# Patient Record
Sex: MALE | Race: BLACK OR AFRICAN AMERICAN | URBAN - METROPOLITAN AREA
[De-identification: names, ages, dates, MRNs, and addresses within clinical notes are randomized per-mention and may not be internally consistent; named-entity substitution may affect disease eponyms.]

---

## 2020-08-27 ENCOUNTER — OFFICE VISIT (OUTPATIENT)
Dept: FAMILY MEDICINE | Facility: CLINIC | Age: 30
End: 2020-08-27
Payer: COMMERCIAL

## 2020-08-27 VITALS
BODY MASS INDEX: 32.03 KG/M2 | RESPIRATION RATE: 16 BRPM | HEIGHT: 71 IN | WEIGHT: 228.8 LBS | DIASTOLIC BLOOD PRESSURE: 64 MMHG | SYSTOLIC BLOOD PRESSURE: 109 MMHG | TEMPERATURE: 97.8 F | OXYGEN SATURATION: 100 % | HEART RATE: 83 BPM

## 2020-08-27 DIAGNOSIS — F17.210 TOBACCO SMOKER, LESS THAN 10 CIGARETTES PER DAY: ICD-10-CM

## 2020-08-27 DIAGNOSIS — M54.50 LUMBAR BACK PAIN: ICD-10-CM

## 2020-08-27 DIAGNOSIS — F19.10 POLYSUBSTANCE ABUSE (H): ICD-10-CM

## 2020-08-27 DIAGNOSIS — Z20.5 EXPOSURE TO HEPATITIS C: Primary | ICD-10-CM

## 2020-08-27 LAB
ALBUMIN SERPL-MCNC: 4.7 MG/DL (ref 3.8–5)
ALP SERPL-CCNC: 76.3 U/L (ref 31.7–110.7)
ALT SERPL-CCNC: 32.4 U/L (ref 0–45)
AST SERPL-CCNC: 32.6 U/L (ref 0–55)
BILIRUB SERPL-MCNC: 0.9 MG/DL (ref 0.2–1.3)
BUN SERPL-MCNC: 18.6 MG/DL (ref 7–21)
CALCIUM SERPL-MCNC: 9.7 MG/DL (ref 8.5–10.1)
CHLORIDE SERPLBLD-SCNC: 103.8 MMOL/L (ref 98–110)
CO2 SERPL-SCNC: 23.5 MMOL/L (ref 20–32)
CREAT SERPL-MCNC: 1.1 MG/DL (ref 0.7–1.3)
GFR SERPL CREATININE-BSD FRML MDRD: 83.5 ML/MIN/1.7 M2
GLUCOSE SERPL-MCNC: 107 MG'DL (ref 70–99)
POTASSIUM SERPL-SCNC: 3.6 MMOL/L (ref 3.3–4.5)
PROT SERPL-MCNC: 7.3 G/DL (ref 6.8–8.8)
SODIUM SERPL-SCNC: 137.7 MMOL/L (ref 132.6–141.4)

## 2020-08-27 SDOH — HEALTH STABILITY: MENTAL HEALTH: HOW OFTEN DO YOU HAVE A DRINK CONTAINING ALCOHOL?: NEVER

## 2020-08-27 ASSESSMENT — ENCOUNTER SYMPTOMS
DECREASED CONCENTRATION: 0
HYPERACTIVE: 0
TREMORS: 0
AGITATION: 0
CHILLS: 0
COUGH: 0
SHORTNESS OF BREATH: 0
PALPITATIONS: 0
NERVOUS/ANXIOUS: 0
SLEEP DISTURBANCE: 0
FEVER: 0
ARTHRALGIAS: 0
BACK PAIN: 0

## 2020-08-27 ASSESSMENT — MIFFLIN-ST. JEOR: SCORE: 2019.08

## 2020-08-27 NOTE — PATIENT INSTRUCTIONS
1. Come back in a month for flu shot     2. Athlen X lower back pain videos/ exercise. Try these out to stregnthen lower back and core     3. If your liver enzymes and all blood work look okay just let me know if the liver pain comes back. If so we will get an ultrasound.     Here is the plan from today's visit    **If you had lab testing today and your results are reassuring or normal they will be mailed to you or sent through [facility] within 7 days.   **If the lab tests need quick action we will call you with the results.  The phone number we will call with results is # [phone] (home) . If this is not the best number please call our clinic and change the number.  Medication Refills:  If you need any refills please call your pharmacy and they will contact us.   If you need to  your refill at a new pharmacy, please contact the new pharmacy directly. The new pharmacy will help you get your medications transferred faster.   Scheduling:  If you have any concerns about today's visit or wish to schedule another appointment please call our office during normal business hours [phone] (8-5:00 M-F)  If a referral was made to a [facility] and you don't get a call from central scheduling please call [phone].  If a Mammogram was ordered for you at [facility] call [phone] to schedule or change your appointment.  If you had an XRay/CT/Ultrasound/MRI ordered the number is [phone] to schedule or change your radiology appointment.   Medical Concerns:  If you have urgent medical concerns please call [phone] at any time of the day.    Bryan Engle MD

## 2020-08-27 NOTE — PROGRESS NOTES
Preceptor Attestation:    Patient seen and evaluated in person. I discussed the patient with the resident. I have verified the content of the note, which accurately reflects my assessment of the patient and the plan of care.   Supervising Physician:  Dominick Llamas MD, MD.

## 2020-08-27 NOTE — PROGRESS NOTES
HPI       Buddy Taylor is a 30 year old  who presents for   Chief Complaint   Patient presents with     Physical     LIver check-previous provider recommended Hep C check       Past Polydrug use history  -opiates, heroin, cocaine. Was on suboxone for about 10 years. Last time doing IV drugs about 2 years ago.   - Lst time he had blood work about a year ago his liver enzymes were up. Also a friend used to use with had hep C.   - NO symptoms now.     Mental Health   - Diagnosed bipolar while actively using. Feels very even great mood now with no substance use. No on any psychoactive meds. 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   - Lives in [place] in [place].   - Has been working out more recently. Trying to lose weight as well.   -did eat oatmeal early this AM     Hx of severe sharp right sided abd pain     Cutting on cig down to 2 a day   Working on cutting     Sexual hx- had chlamdyia 2008 maybe? Last time had intercourse about a year ago. Female partners, lifetime count 20+. Declined GC testing      Tooth Pain Getting tooth pulled and taking Nsaids 2-3 times a day.         Family hx COPD, DM2, HTN     Problem, Medication and Allergy Lists were reviewed and updated if needed..    Patient is an established patient of this clinic..         Review of Systems:   Review of Systems   Constitutional: Negative for chills and fever.   Respiratory: Negative for cough and shortness of breath.    Cardiovascular: Negative for chest pain and palpitations.   Musculoskeletal: Negative for arthralgias and back pain.   Neurological: Negative for tremors.   Psychiatric/Behavioral: Negative for agitation, behavioral problems, decreased concentration, self-injury and sleep disturbance. The patient is not nervous/anxious and is not hyperactive.    All other systems reviewed and are negative.           Physical Exam:     Vitals:    08/27/20 0808   BP: 109/64   BP Location: Right arm   Patient  "Position: Sitting   Cuff Size: Adult Large   Pulse: 83   Resp: 16   Temp: 97.8  F (36.6  C)   TempSrc: Oral   SpO2: 100%   Weight: 103.8 kg (228 lb 12.8 oz)   Height: 1.81 m (5' 11.26\")     Body mass index is 31.68 kg/m .  Vitals were reviewed and were normal     Physical Exam  Constitutional:       General: He is not in acute distress.     Appearance: He is well-developed.   HENT:      Head: Normocephalic and atraumatic.   Eyes:      General:         Right eye: No discharge.         Left eye: No discharge.      Conjunctiva/sclera: Conjunctivae normal.   Pulmonary:      Effort: Pulmonary effort is normal. No respiratory distress.   Abdominal:      Comments: Liver edge 3cm below costal margin   Musculoskeletal:         General: No deformity.      Comments: Mild hyper lordosis, tight lumbar paraspinal muscles. ROM of back/trunk otherwise normal. Tight hamstrings.    Skin:     Coloration: Skin is not pale.      Findings: No erythema or rash.   Neurological:      Mental Status: He is alert and oriented to person, place, and time.      Cranial Nerves: Cranial nerve deficit:         Coordination: Coordination normal.           Results:    Results from this visitNo results found for any visits on 08/27/20.    Assessment and Plan     1. Polysubstance abuse (H)  Screening for hep c, b, HIV. High risk behavior also checked RPR today. Even though multiple female partners pt asymptomatic and declined GC/C testing. Did discuss possible to still have GC/C and not show symptoms. CMP also checkedg given recent NSAID use, past hx elevated liver enzymes. Serum glucose for fam hx DM2 and BMI over 30.     Tdap given today. If patient has chronic hep B or C would do P23 otherwise no need.     2. Tobacco  Self tapering down from 1 pack a day at [place] started at age 20. Now to 2 cigarettes a day. Wants to continue to self taper off. Declined further intervention.     3. Discussed coming back in for influenza shot.     4. Pt w occasional " lumbar back pain not present now. Bilateral without radiation. Couple of times a month.  Worse after working long days/lifting. Discussed working on core strengthening, improving flexibility. Return if more symptomatic or symptoms change.     5. Intermittent RUQ pain.   Will rule out hepatitis causes today. Has improved since stopping drinking. Could be biliary disease as well but given it has not happened in quite some time will not work up further other than above blood work. If sx progress would US.        There are no discontinued medications.    Options for treatment and follow-up care were reviewed with the patient. Buddy Taylor  engaged in the decision making process and verbalized understanding of the options discussed and agreed with the final plan.    Bryan Engle MD    Addend: Patient screening ab hep C positive. Mychart sent to come back for follow up RNA testing to see if cleared, active infection or false +.

## 2020-08-28 LAB
HBV CORE AB SERPL QL IA: NONREACTIVE
HBV SURFACE AB SERPL IA-ACNC: 0 M[IU]/ML
HBV SURFACE AG SERPL QL IA: NONREACTIVE
HCV AB SERPL QL IA: REACTIVE
HIV 1+2 AB+HIV1 P24 AG SERPL QL IA: NONREACTIVE
T PALLIDUM AB SER QL: NONREACTIVE

## 2020-08-31 DIAGNOSIS — Z20.5 EXPOSURE TO HEPATITIS C: ICD-10-CM

## 2020-09-03 DIAGNOSIS — B17.10 ACUTE HEPATITIS C VIRUS INFECTION WITHOUT HEPATIC COMA: ICD-10-CM

## 2020-09-03 LAB
HCV RNA SERPL NAA+PROBE-ACNC: ABNORMAL [IU]/ML
HCV RNA SERPL NAA+PROBE-LOG IU: 6.8 LOG IU/ML

## 2020-09-09 ENCOUNTER — TELEPHONE (OUTPATIENT)
Dept: GASTROENTEROLOGY | Facility: CLINIC | Age: 30
End: 2020-09-09

## 2020-09-09 NOTE — TELEPHONE ENCOUNTER
Labs needed for upcoming appt entered and pt updated.    Carole Joe LPN  Hepatology Clinic        -------  [facility] Call Center    Phone Message    May a detailed message be left on voicemail: yes     Reason for Call: Order(s): Other:   Reason for requested: Pt wants lab orders to be sent to the [facility] in [facility]  Date needed: asap   Provider name: Dr. Guadarrama      Action Taken: Message routed to:  [facility] hep    Travel Screening: Not Applicable

## 2020-09-10 DIAGNOSIS — B17.10 ACUTE HEPATITIS C VIRUS INFECTION WITHOUT HEPATIC COMA: Primary | ICD-10-CM

## 2020-09-10 NOTE — TELEPHONE ENCOUNTER
RECORDS RECEIVED FROM: Internal - Acute hepatitis C virus infection without hepatic coma [B17.10]   Appt Date: 09.15.2020   NOTES STATUS DETAILS   OFFICE NOTE from referring provider Internal 09.03.2020 Consult Jackie Tomas MD    OFFICE NOTES from other specialists N/A    DISCHARGE SUMMARY from hospital N/A    MEDICATION LIST N/A    LIVER BIOSPY (IF APPLICABLE)      PATHOLOGY REPORTS  N/A    IMAGING     ENDOSCOPY (IF AVAILABLE) N/A    COLONOSCOPY (IF AVAILABLE) N/A    ULTRASOUND LIVER N/A    CT OF ABDOMEN N/A    MRI OF LIVER N/A    FIBROSCAN, US ELASTOGRAPHY, FIBROSIS SCAN, MR ELASTOGRAPHY N/A    LABS     HEPATIC PANEL (LIVER PANEL) N/A    BASIC METABOLIC PANEL N/A    COMPLETE METABOLIC PANEL N/A    COMPLETE BLOOD COUNT (CBC) N/A    INTERNATIONAL NORMALIZED RATIO (INR) N/A    HEPATITIS C ANTIBODY Internal [id number]   HEPATITIS C VIRAL LOAD/PCR N/A    HEPATITIS C GENOTYPE N/A    HEPATITIS B SURFACE ANTIGEN Internal 08.27.2020   HEPATITIS B SURFACE ANTIBODY Internal 08.27.2020   HEPATITIS B DNA QUANT LEVEL N/A    HEPATITIS B CORE ANTIBODY Internal 08.27.2020

## 2020-09-11 DIAGNOSIS — B17.10 ACUTE HEPATITIS C VIRUS INFECTION WITHOUT HEPATIC COMA: ICD-10-CM

## 2020-09-11 LAB
BILIRUB DIRECT SERPL-MCNC: 0.2 MG/DL (ref 0–0.2)
ERYTHROCYTE [DISTWIDTH] IN BLOOD BY AUTOMATED COUNT: 12.5 % (ref 10–15)
HCT VFR BLD AUTO: 46.2 % (ref 40–53)
HGB BLD-MCNC: 15.5 G/DL (ref 13.3–17.7)
INR PPP: 1.08 (ref 0.86–1.14)
MCH RBC QN AUTO: 29 PG (ref 26.5–33)
MCHC RBC AUTO-ENTMCNC: 33.5 G/DL (ref 31.5–36.5)
MCV RBC AUTO: 86 FL (ref 78–100)
PLATELET # BLD AUTO: 250 10E9/L (ref 150–450)
RBC # BLD AUTO: 5.35 10E12/L (ref 4.4–5.9)
WBC # BLD AUTO: 5.6 10E9/L (ref 4–11)

## 2020-09-15 ENCOUNTER — PRE VISIT (OUTPATIENT)
Dept: GASTROENTEROLOGY | Facility: CLINIC | Age: 30
End: 2020-09-15

## 2020-09-15 ENCOUNTER — VIRTUAL VISIT (OUTPATIENT)
Dept: GASTROENTEROLOGY | Facility: CLINIC | Age: 30
End: 2020-09-15
Attending: FAMILY MEDICINE
Payer: COMMERCIAL

## 2020-09-15 VITALS — BODY MASS INDEX: 31.5 KG/M2 | HEIGHT: 71 IN | WEIGHT: 225 LBS

## 2020-09-15 DIAGNOSIS — B18.2 CHRONIC HEPATITIS C WITHOUT HEPATIC COMA (H): Primary | ICD-10-CM

## 2020-09-15 RX ORDER — AMOXICILLIN 500 MG/1
500 TABLET, FILM COATED ORAL 3 TIMES DAILY
COMMUNITY
End: 2021-04-19

## 2020-09-15 RX ORDER — IBUPROFEN 600 MG/1
600 TABLET, FILM COATED ORAL EVERY 4 HOURS PRN
COMMUNITY
End: 2021-04-19

## 2020-09-15 ASSESSMENT — MIFFLIN-ST. JEOR: SCORE: 1997.72

## 2020-09-15 NOTE — LETTER
"    9/15/2020         RE: Buddy Taylor  [address] [id number]        Dear Colleague,    Thank you for referring your patient, Buddy Taylor, to the [facility]. Please see a copy of my visit note below.    Chief Complaint   Patient presents with     Consult     NEW PATIENT     Height 1.803 m (5' 11\"), weight 102.1 kg (225 lb).    Buddy Taylor is a 30 year old male who is being evaluated via a billable telephone visit.      The patient has been notified of following:     \"This telephone visit will be conducted via a call between you and your physician/provider. We have found that certain health care needs can be provided without the need for a physical exam.  This service lets us provide the care you need with a short phone conversation.  If a prescription is necessary we can send it directly to your pharmacy.  If lab work is needed we can place an order for that and you can then stop by our lab to have the test done at a later time.    Telephone visits are billed at different rates depending on your insurance coverage. During this emergency period, for some insurers they may be billed the same as an in-person visit.  Please reach out to your insurance provider with any questions.    If during the course of the call the physician/provider feels a telephone visit is not appropriate, you will not be charged for this service.\"    Patient has given verbal consent for Telephone visit?  Yes    What phone number would you like to be contacted at? [phone]    How would you like to obtain your AVS? Mail a copy    Phone call duration: 21 minutes    Catherine Lopez [facility]      Hepatology Clinic Note  Buddy Taylor   Date of Birth 1990  Date of Service 9/19/2020    REASON FOR CONSULTATION: Hepatitis C  REFERRING PROVIDER: Bryan Engle MD          Assessment/plan:   Buddy Taylor is a 30 year old male with Hepatitis C, genotype 1a, treatment naive. Currently " there are no biochemical or physical signs of cirrhosis. We discussed the natural course of Hepatitis C virus and the benefits of treating the disease. We discussed the treatment regimen and medication side effects. Patient would be an excellent candidate for Hepatitis C treatment to prevent worsening fibrosis and to prevent extrahepatic manifestations of the disease.     - Will send prescription for Mavyret X 8 weeks, Epclusa x 12 weeks, Harvoni x 8 weeks   - Repeat HCV RNA at the end of treatment and 12-weeks after finishing treatment to determine SVR  - If patient achieves SVR, he does not need regular follow-up in Hepatology Clinic.   - Follow-up in Hepatology Clinic as needed    Ann Guadarrama PA-C   [facility]    -----------------------------------------------------       HPI:   Buddy Taylor is a 30 year old male  presenting for evaluation and treatment of Hepatitis C.     Hepatitis C   -Genotype 1a   -Diagnosed: 4 years ago  -History: Hx of IVDU  -Prior biopsy: No   -Prior treatments:Naive     Patient states he was diagnosed with Hepatitis C about four years ago. He states he likely acquired the virus through history of IVDU. He last used 3-4 years ago.     Currently his appetite is good. He working on weight loss and lost about 20 lbs through working out more regularly.     Patient denies jaundice, lower extremity edema, abdominal distension or confusion.  Patient also denies melena, hematochezia or hematemesis. Patient denies fevers, sweats or chills.    PMH: history of substance use, in remission, overweight    PSH: No previous surgical history    Medications: no current medications.     He is working on quitting smoking. He currently vapes as well. He admits to history of significant alcohol use in the past, but did drink much in the past seven years. History of IVDU started at age of 19. He is currently the  at a treatment facility. He doesn't plan on moving any  time soon.  No know family history of liver disease or liver cancer.     Lab work-up thus far:   HCV RNA 5.9 million   HBV SAb 0.0  HBV SAg nonreactive  HBV CAb nonreactive  HIV nonreactive  Medical hx Surgical hx   Past Medical History:   Diagnosis Date     Bipolar disorder (H)      Polysubstance abuse (H) 8/27/2020     PTSD (post-traumatic stress disorder)     No past surgical history on file.              Medications:     Current Outpatient Medications   Medication     amoxicillin (AMOXIL) 500 MG tablet     ibuprofen (ADVIL/MOTRIN) 600 MG tablet     No current facility-administered medications for this visit.             Allergies:   No Known Allergies         Social History:     Social History     Socioeconomic History     Marital status: Single     Spouse name: Not on file     Number of children: Not on file     Years of education: Not on file     Highest education level: Not on file   Occupational History     Not on file   Social Needs     Financial resource strain: Not on file     Food insecurity     Worry: Not on file     Inability: Not on file     Transportation needs     Medical: Not on file     Non-medical: Not on file   Tobacco Use     Smoking status: Light Tobacco Smoker     Types: Cigarettes     Smokeless tobacco: Never Used   Substance and Sexual Activity     Alcohol use: Not Currently     Frequency: Never     Drug use: Not Currently     Sexual activity: Not Currently   Lifestyle     Physical activity     Days per week: Not on file     Minutes per session: Not on file     Stress: Not on file   Relationships     Social connections     Talks on phone: Not on file     Gets together: Not on file     Attends [employer] service: Not on file     Active member of club or organization: Not on file     Attends meetings of clubs or organizations: Not on file     Relationship status: Not on file     Intimate partner violence     Fear of current or ex partner: Not on file     Emotionally abused: Not on file      "Physically abused: Not on file     Forced sexual activity: Not on file   Other Topics Concern     Not on file   Social History Narrative     Not on file            Family History:     Family History   Problem Relation Age of Onset     Cancer Mother      Mental Illness Mother      Substance Abuse Mother      Breast Cancer Mother      Diabetes Father      Hypertension Father      Substance Abuse Father             Review of Systems:   GEN: See HPI  HEENT: No change in vision or hearing, mouth sores, dysphagia, lymph nodes  Resp: No shortness of breath, coughing, hx of asthma  CV: No chest pain, palpitations, syncope   GI: See HPI  : No dysuria, history of stones, urine color    Skin: No rash; no pruritus or psoriasis  MS: No arthralgias, myalgias, joint swelling  Neuro: No memory changes, confusion, numbness    Heme: No difficulty clotting, bruising, bleeding  Psych:  No anxiety, depression, agitation          Physical Exam:   VS:  Ht 1.803 m (5' 11\")   Wt 102.1 kg (225 lb)   BMI 31.38 kg/m        PSYCH: Alert and oriented times 3; coherent speech, normal   rate and volume, able to articulate logical thoughts, able   to abstract reason, no tangential thoughts, no hallucinations   or delusions  His affect is normal  RESP: No cough, no audible wheezing, able to talk in full sentences  Remainder of exam unable to be completed due to telephone visits           Data:   Reviewed in person and significant for:    Lab Results   Component Value Date    .7 08/27/2020      Lab Results   Component Value Date    POTASSIUM 3.6 08/27/2020     Lab Results   Component Value Date    CHLORIDE 103.8 08/27/2020     Lab Results   Component Value Date    CO2 23.5 08/27/2020     Lab Results   Component Value Date    BUN 18.6 08/27/2020     Lab Results   Component Value Date    CR 1.1 08/27/2020       Lab Results   Component Value Date    WBC 5.6 09/11/2020     Lab Results   Component Value Date    HGB 15.5 09/11/2020     Lab " Results   Component Value Date    HCT 46.2 09/11/2020     Lab Results   Component Value Date    MCV 86 09/11/2020     Lab Results   Component Value Date     09/11/2020       Lab Results   Component Value Date    AST 32.6 08/27/2020     Lab Results   Component Value Date    ALT 32.4 08/27/2020     No results found for: BILICONJ   Lab Results   Component Value Date    BILITOTAL 0.9 08/27/2020       No results found for: ALBUMIN  Lab Results   Component Value Date    PROTTOTAL 7.3 08/27/2020      Lab Results   Component Value Date    ALKPHOS 76.3 08/27/2020       Lab Results   Component Value Date    INR 1.08 09/11/2020         No recent abdominal imaging       Again, thank you for allowing me to participate in the care of your patient.        Sincerely,        Ann Guadarrama PA-C

## 2020-09-15 NOTE — PROGRESS NOTES
"Chief Complaint   Patient presents with     Consult     NEW PATIENT     Height 1.803 m (5' 11\"), weight 102.1 kg (225 lb).    Buddy Taylor is a 30 year old male who is being evaluated via a billable telephone visit.      The patient has been notified of following:     \"This telephone visit will be conducted via a call between you and your physician/provider. We have found that certain health care needs can be provided without the need for a physical exam.  This service lets us provide the care you need with a short phone conversation.  If a prescription is necessary we can send it directly to your pharmacy.  If lab work is needed we can place an order for that and you can then stop by our lab to have the test done at a later time.    Telephone visits are billed at different rates depending on your insurance coverage. During this emergency period, for some insurers they may be billed the same as an in-person visit.  Please reach out to your insurance provider with any questions.    If during the course of the call the physician/provider feels a telephone visit is not appropriate, you will not be charged for this service.\"    Patient has given verbal consent for Telephone visit?  Yes    What phone number would you like to be contacted at? [phone]    How would you like to obtain your AVS? Mail a copy    Phone call duration: 21 minutes    Catherine Lopez [facility]      Hepatology Clinic Note  Buddy Taylor   Date of Birth 1990  Date of Service 9/19/2020    REASON FOR CONSULTATION: Hepatitis C  REFERRING PROVIDER: Bryan Engle MD          Assessment/plan:   Buddy Taylor is a 30 year old male with Hepatitis C, genotype 1a, treatment naive. Currently there are no biochemical or physical signs of cirrhosis. We discussed the natural course of Hepatitis C virus and the benefits of treating the disease. We discussed the treatment regimen and medication side effects. Patient would be an excellent " candidate for Hepatitis C treatment to prevent worsening fibrosis and to prevent extrahepatic manifestations of the disease.     - Will send prescription for Mavyret X 8 weeks, Epclusa x 12 weeks, Harvoni x 8 weeks   - Repeat HCV RNA at the end of treatment and 12-weeks after finishing treatment to determine SVR  - If patient achieves SVR, he does not need regular follow-up in Hepatology Clinic.   - Follow-up in Hepatology Clinic as needed    Ann Guadarrama PA-C   [facility] Hepatology    -----------------------------------------------------       HPI:   Buddy Taylor is a 30 year old male  presenting for evaluation and treatment of Hepatitis C.     Hepatitis C   -Genotype 1a   -Diagnosed: 4 years ago  -History: Hx of IVDU  -Prior biopsy: No   -Prior treatments:Naive     Patient states he was diagnosed with Hepatitis C about four years ago. He states he likely acquired the virus through history of IVDU. He last used 3-4 years ago.     Currently his appetite is good. He working on weight loss and lost about 20 lbs through working out more regularly.     Patient denies jaundice, lower extremity edema, abdominal distension or confusion.  Patient also denies melena, hematochezia or hematemesis. Patient denies fevers, sweats or chills.    PMH: history of substance use, in remission, overweight    PSH: No previous surgical history    Medications: no current medications.     He is working on quitting smoking. He currently vapes as well. He admits to history of significant alcohol use in the past, but did drink much in the past seven years. History of IVDU started at age of 19. He is currently the  at a treatment facility. He doesn't plan on moving any time soon.  No know family history of liver disease or liver cancer.     Lab work-up thus far:   HCV RNA 5.9 million   HBV SAb 0.0  HBV SAg nonreactive  HBV CAb nonreactive  HIV nonreactive  Medical hx Surgical hx   Past Medical History:    Diagnosis Date     Bipolar disorder (H)      Polysubstance abuse (H) 8/27/2020     PTSD (post-traumatic stress disorder)     No past surgical history on file.              Medications:     Current Outpatient Medications   Medication     amoxicillin (AMOXIL) 500 MG tablet     ibuprofen (ADVIL/MOTRIN) 600 MG tablet     No current facility-administered medications for this visit.             Allergies:   No Known Allergies         Social History:     Social History     Socioeconomic History     Marital status: Single     Spouse name: Not on file     Number of children: Not on file     Years of education: Not on file     Highest education level: Not on file   Occupational History     Not on file   Social Needs     Financial resource strain: Not on file     Food insecurity     Worry: Not on file     Inability: Not on file     Transportation needs     Medical: Not on file     Non-medical: Not on file   Tobacco Use     Smoking status: Light Tobacco Smoker     Types: Cigarettes     Smokeless tobacco: Never Used   Substance and Sexual Activity     Alcohol use: Not Currently     Frequency: Never     Drug use: Not Currently     Sexual activity: Not Currently   Lifestyle     Physical activity     Days per week: Not on file     Minutes per session: Not on file     Stress: Not on file   Relationships     Social connections     Talks on phone: Not on file     Gets together: Not on file     Attends Sikh service: Not on file     Active member of club or organization: Not on file     Attends meetings of clubs or organizations: Not on file     Relationship status: Not on file     Intimate partner violence     Fear of current or ex partner: Not on file     Emotionally abused: Not on file     Physically abused: Not on file     Forced sexual activity: Not on file   Other Topics Concern     Not on file   Social History Narrative     Not on file            Family History:     Family History   Problem Relation Age of Onset     Cancer  "Mother      Mental Illness Mother      Substance Abuse Mother      Breast Cancer Mother      Diabetes Father      Hypertension Father      Substance Abuse Father             Review of Systems:   GEN: See HPI  HEENT: No change in vision or hearing, mouth sores, dysphagia, lymph nodes  Resp: No shortness of breath, coughing, hx of asthma  CV: No chest pain, palpitations, syncope   GI: See HPI  : No dysuria, history of stones, urine color    Skin: No rash; no pruritus or psoriasis  MS: No arthralgias, myalgias, joint swelling  Neuro: No memory changes, confusion, numbness    Heme: No difficulty clotting, bruising, bleeding  Psych:  No anxiety, depression, agitation          Physical Exam:   VS:  Ht 1.803 m (5' 11\")   Wt 102.1 kg (225 lb)   BMI 31.38 kg/m        PSYCH: Alert and oriented times 3; coherent speech, normal   rate and volume, able to articulate logical thoughts, able   to abstract reason, no tangential thoughts, no hallucinations   or delusions  His affect is normal  RESP: No cough, no audible wheezing, able to talk in full sentences  Remainder of exam unable to be completed due to telephone visits           Data:   Reviewed in person and significant for:    Lab Results   Component Value Date    .7 08/27/2020      Lab Results   Component Value Date    POTASSIUM 3.6 08/27/2020     Lab Results   Component Value Date    CHLORIDE 103.8 08/27/2020     Lab Results   Component Value Date    CO2 23.5 08/27/2020     Lab Results   Component Value Date    BUN 18.6 08/27/2020     Lab Results   Component Value Date    CR 1.1 08/27/2020       Lab Results   Component Value Date    WBC 5.6 09/11/2020     Lab Results   Component Value Date    HGB 15.5 09/11/2020     Lab Results   Component Value Date    HCT 46.2 09/11/2020     Lab Results   Component Value Date    MCV 86 09/11/2020     Lab Results   Component Value Date     09/11/2020       Lab Results   Component Value Date    AST 32.6 08/27/2020     Lab " Results   Component Value Date    ALT 32.4 08/27/2020     No results found for: BILICONJ   Lab Results   Component Value Date    BILITOTAL 0.9 08/27/2020       No results found for: ALBUMIN  Lab Results   Component Value Date    PROTTOTAL 7.3 08/27/2020      Lab Results   Component Value Date    ALKPHOS 76.3 08/27/2020       Lab Results   Component Value Date    INR 1.08 09/11/2020         No recent abdominal imaging

## 2020-09-18 LAB — HCV GENTYP SERPL NAA+PROBE: NORMAL

## 2020-09-22 ENCOUNTER — TELEPHONE (OUTPATIENT)
Dept: GASTROENTEROLOGY | Facility: CLINIC | Age: 30
End: 2020-09-22

## 2020-09-22 NOTE — TELEPHONE ENCOUNTER
PA Initiation    Medication: Mavyret  Insurance Company: [employer] - Phone [phone] Fax [phone]  Pharmacy Filling the Rx: [place], [place] - [address]  Filling Pharmacy Phone: [phone]  Filling Pharmacy Fax: [phone]  Start Date: 9/22/2020

## 2020-09-22 NOTE — TELEPHONE ENCOUNTER
Billie from [facility] called to confirm PA has been approved. Effective dates: 09/22/2020-10/31/2020    Ariana العراقي PA Team

## 2020-09-23 NOTE — TELEPHONE ENCOUNTER
Prior Authorization Approval    Authorization Effective Date: 9/22/2020  Authorization Expiration Date: 10/31/2020  Medication: Mavyret  Approved Dose/Quantity: 8 weeks  Reference #: [id number]   Insurance Company: [employer] - Phone [phone] Fax [phone]  Expected CoPay: unknown     Which Pharmacy is filling the prescription (Not needed for infusion/clinic administered): [place], [place] - [address]  Pharmacy Notified: Yes  Patient Notified: Yes

## 2020-10-01 ENCOUNTER — CARE COORDINATION (OUTPATIENT)
Dept: GASTROENTEROLOGY | Facility: CLINIC | Age: 30
End: 2020-10-01

## 2020-10-01 DIAGNOSIS — B18.2 CHRONIC HEPATITIS C WITHOUT HEPATIC COMA (H): Primary | ICD-10-CM

## 2020-10-01 NOTE — LETTER
March 18, 2021       TO: Buddy Taylor  [address] [address]       Dear ,    We are writing to inform you of your test results. Approximately 3 months ago, you completed treatment for Hepatitis C. We recently retested for the virus, and the results show that the virus was Not Detected in your system. This is considered a cure! Congratulations!     When a cure is achieved:    1) You are no longer considered to be infectious for Hepatitis C.     2) A cure does not mean you have immunity. You can still be reinfected if you come into contact with infected blood. Avoiding sex with infected people, avoid reusing needles and razors that came into contact with infected blood.     3) You will always test positive for the Hepatitis C antibody. Your Hepatitis C RNA Quant (viral level) will remain undetected as long as you avoid risks for reinfection.     Please continue to take the necessary precautions to prevent reinfection. You do not need any further follow up in the hepatology clinic.  If you have any further questions or concerns, you may contact the clinic.     Clinic Staff - [phone] option 3     Sincerely,     RENA Arce  [address], [id number]  [place]  [address].

## 2020-10-01 NOTE — LETTER
October 1, 2020       TO: Buddy Taylor  [address] [address]       Dear ,    Hepatitis C Treatment    Treatment: Mavyret x 8 weeks    Please have labs drawn as close to the date indicated at the [facility] or [facility].    Start Date: 09/30/20    Week 8-End of Treatment  HCV RNA Quant Lab due: 11/25/2020  Please have this lab drawn on or within a week after this date 11/25/2020. You will need to repeat this lab 3 months after completing treatment to ensure you have cleared the virus. Please see date for the final lab below. You do not need to fast for this lab.     3 Months Post Treatment  HCV RNA Quant Lab due: 2/23/2021  Please have this lab drawn on the specified date of 2/23/2021 or within a week after. This final lab will determine if you have cleared the Hepatitis C virus with Mavyret treatment. Without this final lab, we will be unable to determine if treatment was successful. You do not need to fast for this lab.     Educational information to patient on Hep C treatment;     -Contact the [facility] Hepatology clinic and speak with clinical RN prior to starting any new prescribed or OTC medications.   -Take medications exactly as prescribed, do not change dose or stop taking without consulting your provider.   -Take Medication one time each day with or without food  -If you miss a dose of medication, then take it as soon as you remember on the same day. If not remembered on the same day, then skip the dose and take the next dose at the usual time. Do not take more than the recommended dose. Contact the clinic if you miss a dose.    Please contact the pharmacy 1-2 weeks prior to needing a medication refill.      Side Effects  The most common side effects of Hep C medication treatment can include:  -tiredness  -headache  -nausea  Notify the clinic of any side effects that bother you or that do not go away.   Possible side effects have been discussed.   Patient has been  instructed to clinic for rash, itching or unmanageable nausea.    How to store Hep C Treatment Medications  -Store Medication at room temperature below 86 degrees F  -Keep Medication in it's original container  -Do not use Medication if the seal is broken or missing    General information  It is not known if treatment will prevent you from infecting another person or reinfecting yourself with the hepatitis C virus during treatment. It is best that as soon as you start treatment to buy a new toothbrush, disposable razors (if you use a rotating shaver you do not need to buy a new one) and nail clippers. If you wear dentures, you should soak your dentures in 70-90% Isopropyl solution for 5 minutes one time within the first week of starting treatment. After dentures are done soaking, rinse your dentures off thoroughly with water. If you check your blood sugar at home, please dispose of the fingerstick needle after each use and DO NOT REUSE the insulin needles. These items should not be shared with anyone.        If you have any questions, please contact the main clinic at [phone] or your clinical RN at [phone]. We appreciate you choosing the [facility] clinic for your treatment.    Shana Hooper RN Care Coordinator   [facility]  Hepatology Clinic/Specialty Program

## 2020-10-01 NOTE — LETTER
February 23, 2021       TO: Buddy Taylor  [address] [id number]       Dear ,     Per our records, the medication Mavyret was prescribed to treat hepatitis C, and you should have completed this treatment approximately 11/25/2020. You are due for another lab draw called HCV RNA quant. This final lab will determine if you have cleared the Hepatitis C virus with Mavyret treatment. Without this final lab, we will be unable to determine if treatment was successful. You do not need to fast for this lab. You may schedule a lab appointment with any [facility] to get this lab completed.     Please contact the clinic at [phone] or [phone] and ask to speak with an RN if you have further questions regarding your Hepatitis C treatment.         Thank you,    Shana Hooper RN, BSN  [facility]  Care Coordinator Hepatology/ Specialty Program

## 2020-10-01 NOTE — PROGRESS NOTES
Connected with patient for f/u on Hep C treatment delivery/start status. Patient received their Mavyret medication and are ready to start treatment. Patient will be on Hep C treatment for 8 weeks. Patient reports no recent changes in health, hospitalizations or recent changes in medications. Patient did discuss with a pharmacist. Reviewed the following Hep C POC and education with patient.     Hepatitis C Treatment  Treatment: Mavyret x 8 weeks  Genotype: 1a  Stage Fibrosis: (no biochemical or physical signs of cirrhosis)  Previous Treatment Outcome: Naive    Please have labs drawn as close to the date indicated at the [facility].    Start Date: 09/30/20    Week 8-End of Treatment  HCV RNA Quant Lab due: 11/25/2020  Please have this lab drawn on or within a week after this date 11/25/2020. You will need to repeat this lab 3 months after completing treatment to ensure you have cleared the virus. Please see date for the final lab below. You do not need to fast for this lab.     3 Months Post Treatment  HCV RNA Quant Lab due: 2/23/2021  Please have this lab drawn on the specified date of 2/23/2021 or within a week after. This final lab will determine if you have cleared the Hepatitis C virus with Mavyret treatment. Without this final lab, we will be unable to determine if treatment was successful. You do not need to fast for this lab.     Educational information to patient on Hep C treatment;     -Contact the [facility] Hepatology clinic and speak with clinical RN prior to starting any new prescribed or OTC medications.   -Take medications exactly as prescribed, do not change dose or stop taking without consulting your provider.   -Take Medication one time each day with or without food  -If you miss a dose of medication, then take it as soon as you remember on the same day. If not remembered on the same day, then skip the dose and take the next dose at the usual time. Do not take more than the recommended dose.  Contact the clinic if you miss a dose.    Please contact the pharmacy 1-2 weeks prior to needing a medication refill.      Side Effects  The most common side effects of Hep C medication treatment can include:  -tiredness  -headache  -nausea  Notify the clinic of any side effects that bother you or that do not go away.   Possible side effects have been discussed.   Patient has been instructed to clinic for rash, itching or unmanageable nausea.    How to store Hep C Treatment Medications  -Store Medication at room temperature below 86 degrees F  -Keep Medication in it's original container  -Do not use Medication if the seal is broken or missing    General information  It is not known if treatment will prevent you from infecting another person or reinfecting yourself with the hepatitis C virus during treatment. It is best that as soon as you start treatment to buy a new toothbrush, disposable razors (if you use a rotating shaver you do not need to buy a new one) and nail clippers. If you wear dentures, you should soak your dentures in 70-90% Isopropyl solution for 5 minutes one time within the first week of starting treatment. After dentures are done soaking, rinse your dentures off thoroughly with water. If you check your blood sugar at home, please dispose of the fingerstick needle after each use and DO NOT REUSE the insulin needles. These items should not be shared with anyone.        If you have any questions, please contact the main clinic at [phone] or your clinical RN at [phone]. We appreciate you choosing the [facility] clinic for your treatment. Patient agrees to Hep C treatment POC and verbalizes understanding. Patient will receive a copy of treatment plan in the mail, address verified with patient. Patient has no further questions or concerns. Hep C care team updated on patient status.      Shana Hooper RN Care Coordinator   [facility]  Hepatology  Clinic/Specialty Program

## 2020-10-01 NOTE — LETTER
November 25, 2020       TO: Buddy Taylor  [address] [address]       Dear ,    Per our records, you should have finished Mavyret x 8 weeks around 11/25/2020. Please have your end of treatment labs drawn within 1-2 weeks. The order for this lab (HCV RNA quantitative) is in your chart and you can go to any [place] or [facility] to get this lab drawn. If you have any questions regarding this letter, please contact the clinic at [phone] or [phone] and ask to speak with a RN regarding your Hepatitis C treatment plan.       Thank you,    Shana Hooper RN, BSN  [facility] Medicine Specialty 3 [facility]  Care Coordinator Hepatology/ Specialty Program

## 2020-10-14 NOTE — PROGRESS NOTES
Called pt to check in since starting Mavyret. Pt reported feeling good and denied experiencing any side effects from medication. Reminded pt that labs will be needed upon completion of treatment. Encouraged pt to call writer with any questions or concerns. Pt verbalized understanding and is agreeable to plan.

## 2020-12-07 DIAGNOSIS — B18.2 CHRONIC HEPATITIS C WITHOUT HEPATIC COMA (H): ICD-10-CM

## 2020-12-07 PROCEDURE — 87522 HEPATITIS C REVRS TRNSCRPJ: CPT | Performed by: FAMILY MEDICINE

## 2020-12-07 PROCEDURE — 36415 COLL VENOUS BLD VENIPUNCTURE: CPT | Performed by: FAMILY MEDICINE

## 2020-12-09 LAB
HCV RNA SERPL NAA+PROBE-ACNC: NORMAL [IU]/ML
HCV RNA SERPL NAA+PROBE-LOG IU: NORMAL LOG IU/ML

## 2021-01-04 ENCOUNTER — HEALTH MAINTENANCE LETTER (OUTPATIENT)
Age: 31
End: 2021-01-04

## 2021-03-01 DIAGNOSIS — B18.2 CHRONIC HEPATITIS C WITHOUT HEPATIC COMA (H): ICD-10-CM

## 2021-03-01 PROCEDURE — 87522 HEPATITIS C REVRS TRNSCRPJ: CPT | Performed by: STUDENT IN AN ORGANIZED HEALTH CARE EDUCATION/TRAINING PROGRAM

## 2021-03-01 PROCEDURE — 36415 COLL VENOUS BLD VENIPUNCTURE: CPT | Performed by: STUDENT IN AN ORGANIZED HEALTH CARE EDUCATION/TRAINING PROGRAM

## 2021-03-03 LAB
HCV RNA SERPL NAA+PROBE-ACNC: NORMAL [IU]/ML
HCV RNA SERPL NAA+PROBE-LOG IU: NORMAL LOG IU/ML

## 2021-04-19 ENCOUNTER — OFFICE VISIT (OUTPATIENT)
Dept: FAMILY MEDICINE | Facility: CLINIC | Age: 31
End: 2021-04-19
Payer: COMMERCIAL

## 2021-04-19 VITALS
RESPIRATION RATE: 16 BRPM | OXYGEN SATURATION: 98 % | BODY MASS INDEX: 32.44 KG/M2 | SYSTOLIC BLOOD PRESSURE: 124 MMHG | WEIGHT: 232.6 LBS | TEMPERATURE: 97.9 F | DIASTOLIC BLOOD PRESSURE: 73 MMHG | HEART RATE: 79 BPM

## 2021-04-19 DIAGNOSIS — G89.29 CHRONIC RIGHT-SIDED LOW BACK PAIN WITH RIGHT-SIDED SCIATICA: ICD-10-CM

## 2021-04-19 DIAGNOSIS — M62.830 LUMBAR PARASPINAL MUSCLE SPASM: Primary | ICD-10-CM

## 2021-04-19 DIAGNOSIS — M54.41 CHRONIC RIGHT-SIDED LOW BACK PAIN WITH RIGHT-SIDED SCIATICA: ICD-10-CM

## 2021-04-19 PROCEDURE — 99213 OFFICE O/P EST LOW 20 MIN: CPT | Performed by: FAMILY MEDICINE

## 2021-04-19 NOTE — PROGRESS NOTES
Assessment & Plan     Lumbar paraspinal muscle spasm  Discussed supportive care for acute symptoms  Recommend formal PT for chronic low back spasm with R sided sciatica  - [facility] PT, HAND AND CHIROPRACTIC REFERRAL - [facility]; Future    Chronic right-sided low back pain with right-sided sciatica  - [facility] PT, HAND AND CHIROPRACTIC REFERRAL - [facility]; Future    No follow-ups on file.    Adriana HIWOT Hyatt DO  [facility]    [facility]   Buddy is a 30 year old who presents for the following health issues:    HPI     Recurrent back pain  R side, low back  Radiates down back of leg w/ numbness/tingling  Occurring more frequently, 1-2x/year, now 4-5x/year  Exacerbated by exercise, lifting, bending forward  Acute symptoms last 1-2 weeks  Difficulty walking due to pain  Takes Aleve, back massage  No symptoms currently, last occurrence was 2 weeks ago    Review of Systems   Constitutional, HEENT, cardiovascular, pulmonary, gi and gu systems are negative, except as otherwise noted.      Objective    /73   Pulse 79   Temp 97.9  F (36.6  C) (Oral)   Resp 16   Wt 105.5 kg (232 lb 9.6 oz)   SpO2 98%   BMI 32.44 kg/m    Body mass index is 32.44 kg/m .  Physical Exam   GENERAL: healthy, alert and no distress  RESP: lungs clear to auscultation - no rales, rhonchi or wheezes  CV: regular rate and rhythm, normal S1 S2, no S3 or S4, no murmur, click or rub, no peripheral edema and peripheral pulses strong  MS:   - b/l lumbar paraspinal hypertonicity   - negative straight leg raise   - restricted passive piriformis stretch

## 2021-05-05 ENCOUNTER — THERAPY VISIT (OUTPATIENT)
Dept: PHYSICAL THERAPY | Facility: CLINIC | Age: 31
End: 2021-05-05
Attending: FAMILY MEDICINE
Payer: COMMERCIAL

## 2021-05-05 DIAGNOSIS — M54.41 CHRONIC RIGHT-SIDED LOW BACK PAIN WITH RIGHT-SIDED SCIATICA: ICD-10-CM

## 2021-05-05 DIAGNOSIS — G89.29 CHRONIC RIGHT-SIDED LOW BACK PAIN WITH RIGHT-SIDED SCIATICA: ICD-10-CM

## 2021-05-05 DIAGNOSIS — M62.830 LUMBAR PARASPINAL MUSCLE SPASM: ICD-10-CM

## 2021-05-05 DIAGNOSIS — M54.41 ACUTE RIGHT-SIDED LOW BACK PAIN WITH RIGHT-SIDED SCIATICA: ICD-10-CM

## 2021-05-05 PROCEDURE — 97161 PT EVAL LOW COMPLEX 20 MIN: CPT | Mod: GP | Performed by: PHYSICAL THERAPIST

## 2021-05-05 PROCEDURE — 97110 THERAPEUTIC EXERCISES: CPT | Mod: GP | Performed by: PHYSICAL THERAPIST

## 2021-05-05 PROCEDURE — 97112 NEUROMUSCULAR REEDUCATION: CPT | Mod: GP | Performed by: PHYSICAL THERAPIST

## 2021-05-05 NOTE — PROGRESS NOTES
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 Pt reports that he's had back issues for years now which he relates to football back in [employer]. Reports that his back goes out about 2-3 times a year. At times pain down into the groin and right leg. Pain is worse with bending, lifting, athletics. Numbness in the right, leg but otherwise denies vague symptoms. Would like to be able work, lift weights(deadlifts, squats, crutches) and perform all daily activities pain free.     Symptoms commenced as a result of: lifting with moving company and exercise at gym. Condition occurred in the following environment: community. Onset of symptoms: 3/12/21. Location of symptoms: lower lumbar right into the groin and right leg to the ankle. Pain level on number scale: 4/10. Quality of pain: throbbing, aching, sharp. Associated symptoms: leg pain, numbness in the right leg. Pain frequency (constant/intermittent): intermittent. Symptoms are exacerbated by: bending, lifting, sleeping, weightlifting. Symptoms are relieved by: certain stretches. Progression of symptoms since onset (same/better/worse): same. Special tests (x-ray, MRI, CT scan, EMG, bone scan): none. Previous treatment: none. Improvement with previous treatment: none. General health as reported by patient is  good. Pertinent medical history includes: See Epic. Medical allergies: see Epic. Other pertinent surgeries: see Epic. Current medications: See Epic. Occupation:  at [employer]. Patient is (working in normal job without restrictions/working in normal job with restrictions/working in an alternate job/not working due to present treatment problem): normal. Primary job tasks: computer work. Barriers at home/work: None reported by patient. Red flags: None reported by patient.    Objective  Posture: forward head, rounded shoulders, slouched forward moderately    Gait: normal     Screening: negative hip    Flexibility: unremarkable    Lumbar Movement Loss Response   Flexion To lower shin tolerable and with repeated motion no change in moderate leg symptoms(there at baseline)   Extension Slightly limited no pain and with repeated motion improves   Side bending/glide L Full no pain   Side bending/glide R Full no pain    Rotation L Full no pain   Rotation R Full no pain   Quadrants (if applicable)      Hip PROM/Strength: ROM WFL sarah pain free, Hip Abduction L 4+/5 R 4+/5    Neurological:    Myotomes L R   L1-2 (hip flexion) 5/5 5/5   L3 (knee extension) 5/5 5/5   L4 (ankle DF) 5/5 5/5   L5 (g. toe ext) 5/5 5/5   S1 (ankle PF or knee flex) 5/5 5/5     Sensory Deficit: unremarkable    Reflexes: Patellar L 0 and R 0, Achilles L 2+ and R 2+    Dural Signs L R   Slump - -   SLR - -     UMN Tests: Babinski negative    Palpation: unremarkable    Prone Assessment: MILTON no pain, Press-ups slightly limited and with repeated motion improves    Accessory Motion: CPA L1-L5 unremarkable sarah    Functional Squat and Balance: good squat with slight pain and fair SLS sarah.    Other Tests: none    Assessment/Plan:    Patient is a 30 year old male with lumbar complaints.    Patient has the following significant findings with corresponding treatment plan.                Diagnosis 1:  Lumbar pain with right sided radiculopathy  Pain -   manual therapy, self management, education and home program  Decreased ROM/flexibility - manual therapy and therapeutic exercise  Decreased joint mobility - manual therapy and therapeutic exercise  Decreased strength - therapeutic exercise and therapeutic activities  Impaired muscle performance - neuro re-education  Decreased function - therapeutic activities  Impaired posture - neuro re-education    Therapy Evaluation Codes:   1) History comprised of:   Personal factors that impact the plan of care:      Time since onset of symptoms.    Comorbidity factors that impact the plan of care are:      Chemical Dependency and Depression.     Medications impacting care: see epic.  2) Examination of Body Systems comprised of:   Body structures and functions that impact the plan of care:      Lumbar spine.   Activity limitations that impact the plan of care are:      Bending, Driving, Lifting, Reading/Computer work, Running, Sitting, Sports, Squatting/kneeling, Working, Sleeping and Laying down.  3) Clinical presentation characteristics are:   Stable/Uncomplicated.  4) Decision-Making    Low complexity using standardized patient assessment instrument and/or measureable assessment of functional outcome.  Cumulative Therapy Evaluation is: Low complexity.    Previous and current functional limitations:  (See Goal Flow Sheet for this information)    Short term and Long term goals: (See Goal Flow Sheet for this information)     Communication ability:  Patient appears to be able to clearly communicate and understand verbal and written communication and follow directions correctly.  Treatment Explanation - The following has been discussed with the patient:   RX ordered/plan of care  Anticipated outcomes  Possible risks and side effects  This patient would benefit from PT intervention to resume normal activities.   Rehab potential is good.    Frequency:  1 X week, once daily  Duration:  for 6 weeks  Discharge Plan:  Achieve all  LTG.  Independent in home treatment program.  Reach maximal therapeutic benefit.    Please refer to the daily flowsheet for treatment today, total treatment time and time spent performing 1:1 timed codes.

## 2021-06-16 PROBLEM — M54.41 ACUTE RIGHT-SIDED LOW BACK PAIN WITH RIGHT-SIDED SCIATICA: Status: RESOLVED | Noted: 2021-05-05 | Resolved: 2021-06-16

## 2021-06-16 NOTE — PROGRESS NOTES
Patient seen for one time evaluation and treatment.  Patient did not return for further treatment and current status is unknown.  Please see initial evaluation for further information.      Please Contact me with any questions or concerns. Thank you for for patience and cooperation.     Veto Robert PT, DPT, [facility]  Physical Therapist  [facility] [place]  [phone]

## 2021-08-05 ENCOUNTER — OFFICE VISIT (OUTPATIENT)
Dept: FAMILY MEDICINE | Facility: CLINIC | Age: 31
End: 2021-08-05
Payer: COMMERCIAL

## 2021-08-05 VITALS
HEART RATE: 68 BPM | SYSTOLIC BLOOD PRESSURE: 120 MMHG | DIASTOLIC BLOOD PRESSURE: 77 MMHG | OXYGEN SATURATION: 96 % | TEMPERATURE: 98.2 F

## 2021-08-05 DIAGNOSIS — R05.9 COUGH: ICD-10-CM

## 2021-08-05 DIAGNOSIS — R53.83 FATIGUE, UNSPECIFIED TYPE: Primary | ICD-10-CM

## 2021-08-05 DIAGNOSIS — Z20.822 EXPOSURE TO COVID-19 VIRUS: ICD-10-CM

## 2021-08-05 PROCEDURE — 99213 OFFICE O/P EST LOW 20 MIN: CPT | Performed by: FAMILY MEDICINE

## 2021-08-05 PROCEDURE — U0005 INFEC AGEN DETEC AMPLI PROBE: HCPCS | Performed by: FAMILY MEDICINE

## 2021-08-05 PROCEDURE — U0003 INFECTIOUS AGENT DETECTION BY NUCLEIC ACID (DNA OR RNA); SEVERE ACUTE RESPIRATORY SYNDROME CORONAVIRUS 2 (SARS-COV-2) (CORONAVIRUS DISEASE [COVID-19]), AMPLIFIED PROBE TECHNIQUE, MAKING USE OF HIGH THROUGHPUT TECHNOLOGIES AS DESCRIBED BY CMS-2020-01-R: HCPCS | Performed by: FAMILY MEDICINE

## 2021-08-05 NOTE — PATIENT INSTRUCTIONS
Instructions for Patients      Thank you for limiting contact with others, wearing a simple mask to cover your cough, practice good hand hygiene habits and accessing our virtual services where possible to limit the spread of this virus.    For more information about COVID19 and options for caring for yourself at home, please visit the CDC website at https://www.cdc.gov/coronavirus/2019-ncov/about/steps-when-sick.html  For more options for care at [facility], please visit our website at [email or web address]    Quarantine  Quarantine if you have been in close contact (within 6 feet of someone for a cumulative total of 15 minutes or more over a 24-hour period) with someone who has COVID-19, unless you have been fully vaccinated. People who are fully vaccinated do NOT need to quarantine after contact with someone who had COVID-19 unless they have symptoms. However, fully vaccinated people should get tested 3-5 days after their exposure, even they don t have symptoms and wear a mask indoors in public for 14 days following exposure or until their test result is negative.    What to do  Stay home for 14 days after your last contact with a person who has COVID-19.  Watch for fever (100.4?F), cough, shortness of breath, or other symptoms of COVID-19.  If possible, stay away from people you live with, especially people who are at higher risk for getting very sick from COVID-19.  After quarantine  Watch for symptoms until 14 days after exposure.  If you have symptoms, immediately self-isolate and contact your local public health authority or healthcare provider.  You may be able to shorten your quarantine  Your local public health authorities make the final decisions about how long quarantine should last, based on local conditions and needs. Follow the recommendations of your local public health department if you need to quarantine. Options they will consider include stopping  quarantine    After day 10 without testing  After day 7 after receiving a negative test result (test must occur on day 5 or later)    Isolation  Isolation is used to separate people infected with COVID-19 from those who are not infected.    People who are in isolation should stay home until it s safe for them to be around others. At home, anyone sick or infected should separate from others, stay in a specific  sick room  or area, and use a separate bathroom (if available).    What to do  Monitor your symptoms. If you have an emergency warning sign (including trouble breathing), seek emergency medical care immediately.  Stay in a separate room from other household members, if possible.  Use a separate bathroom, if possible.  Avoid contact with other members of the household and pets.  Don t share personal household items, like cups, towels, and utensils.  Wear a mask when around other people if able.  Learn more about what to do if you are sick and how to notify your contacts.    When You Can be Around Others After You Had or Likely Had COVID-19  Most people do not require testing to decide when they can be around others; however, if your healthcare provider recommends testing, they will let you know when you can resume being around others based on your test results.    For Anyone Who Has Been Around a Person with COVID-19  Anyone who has had close contact with someone with COVID-19 should stay home for 14 days after their last exposure to that person.    However, anyone who has had close contact with someone with COVID-19 and who meets the following criteria does NOT need to stay home.    Someone who has been fully vaccinated and shows no symptoms of COVID-19. However, fully vaccinated people should get tested 3-5 days after their exposure, even they don t have symptoms and wear a mask indoors in public for 14 days following exposure or until their test result is negative.  Or    Someone who has COVID-19 illness  within the previous 3 months and  Has recovered and  Remains without COVID-19 symptoms (for example, cough, shortness of breath)  I think or know I had COVID-19, and I had symptoms  You can be around others after:    10 days since symptoms first appeared and  24 hours with no fever without the use of fever-reducing medications and  Other symptoms of COVID-19 are improving*  *Loss of taste and smell may persist for weeks or months after recovery and need not delay the end of isolation?    Note that these recommendations do not apply to people with severe COVID-19 or with weakened immune systems (immunocompromised).    I tested positive for COVID-19 but had no symptoms  If you continue to have no symptoms, you can be with others after 10 days have passed since you had a positive viral test for COVID-19.    If you develop symptoms after testing positive, follow the guidance above for  I think or know I had COVID-19, and I had symptoms.

## 2021-08-05 NOTE — PROGRESS NOTES
"    Assessment & Plan     PUI  Fatigue, unspecified type  Cough  Exposure to COVID-19 virus  Symptoms improving and attributable to other causes, however especially with his known exposure and unvaccinated status, there is a strong liklihood that he is COVID positive. He is instructed to quarantine until results. Long discussion about vaccine recommendation (if COVID positive, 14 days after end of symptoms) his reasons for hesitancy and the vaccine safety. He knows he can come to our pharmacy anytime to get any of the 3 shots.   - Symptomatic COVID-19 Virus (Coronavirus) by PCR Nasopharyngeal    20 minutes spent on the date of the encounter doing chart review, history and exam, documentation and further activities per the note       Tobacco Cessation:   reports that he has been smoking cigarettes. He has never used smokeless tobacco.      BMI:   Estimated body mass index is 32.44 kg/m  as calculated from the following:    Height as of 9/15/20: 1.803 m (5' 11\").    Weight as of 4/19/21: 105.5 kg (232 lb 9.6 oz).     No follow-ups on file.    Shahla Metz MD  [facility] ALESSIO Goodwin is a 31 year old who presents for the following health issues   HPI     covid in October  3 days ago fatigue started  2 days of cough (nonproductive)  No fevers/chills  No SOB or loss of taste/smell  Symptoms overall improving  unvaccinated    Works at [employer], a resident tested positive on Saturday or Sunday (gave him dinner) - pt was masked/gloved - resident was not.     Review of Systems         Objective    /77   Pulse 68   Temp 98.2  F (36.8  C) (Oral)   SpO2 96%   There is no height or weight on file to calculate BMI.  Physical Exam  Vitals reviewed.   Constitutional:       Appearance: He is well-developed.   HENT:      Head: Normocephalic and atraumatic.      Right Ear: External ear normal.      Left Ear: External ear normal.   Eyes:      Conjunctiva/sclera: Conjunctivae normal. "      Pupils: Pupils are equal, round, and reactive to light.   Neck:      Thyroid: No thyromegaly.   Cardiovascular:      Rate and Rhythm: Normal rate and regular rhythm.      Heart sounds: Normal heart sounds. No murmur heard.     Pulmonary:      Effort: Pulmonary effort is normal. No respiratory distress.      Breath sounds: Normal breath sounds. No wheezing.   Abdominal:      General: There is no distension.      Tenderness: There is no abdominal tenderness.   Musculoskeletal:         General: No tenderness. Normal range of motion.      Cervical back: Normal range of motion and neck supple.   Skin:     General: Skin is warm and dry.      Coloration: Skin is not pale.      Findings: No erythema or rash.   Neurological:      Cranial Nerves: No cranial nerve deficit.

## 2021-08-06 LAB — SARS-COV-2 RNA RESP QL NAA+PROBE: NEGATIVE

## 2021-10-11 ENCOUNTER — HEALTH MAINTENANCE LETTER (OUTPATIENT)
Age: 31
End: 2021-10-11

## 2021-10-29 ENCOUNTER — OFFICE VISIT (OUTPATIENT)
Dept: FAMILY MEDICINE | Facility: CLINIC | Age: 31
End: 2021-10-29
Payer: COMMERCIAL

## 2021-10-29 VITALS
HEART RATE: 79 BPM | TEMPERATURE: 97.9 F | DIASTOLIC BLOOD PRESSURE: 76 MMHG | BODY MASS INDEX: 30.43 KG/M2 | WEIGHT: 218.2 LBS | OXYGEN SATURATION: 98 % | SYSTOLIC BLOOD PRESSURE: 136 MMHG | RESPIRATION RATE: 16 BRPM

## 2021-10-29 DIAGNOSIS — Z86.19 HEPATITIS C VIRUS INFECTION RESOLVED AFTER ANTIVIRAL DRUG THERAPY: ICD-10-CM

## 2021-10-29 DIAGNOSIS — F11.21 OPIOID USE DISORDER, SEVERE, IN SUSTAINED REMISSION (H): ICD-10-CM

## 2021-10-29 DIAGNOSIS — M62.830 BACK MUSCLE SPASM: Primary | ICD-10-CM

## 2021-10-29 DIAGNOSIS — Z23 NEED FOR VACCINATION: ICD-10-CM

## 2021-10-29 PROBLEM — F19.10 POLYSUBSTANCE ABUSE (H): Status: RESOLVED | Noted: 2020-08-27 | Resolved: 2021-10-29

## 2021-10-29 PROBLEM — B17.10 ACUTE HEPATITIS C VIRUS INFECTION WITHOUT HEPATIC COMA: Status: RESOLVED | Noted: 2020-09-03 | Resolved: 2021-10-29

## 2021-10-29 PROBLEM — F17.210 TOBACCO SMOKER, LESS THAN 10 CIGARETTES PER DAY: Status: RESOLVED | Noted: 2020-08-27 | Resolved: 2021-10-29

## 2021-10-29 PROCEDURE — 99214 OFFICE O/P EST MOD 30 MIN: CPT | Performed by: FAMILY MEDICINE

## 2021-10-29 SDOH — ECONOMIC STABILITY: TRANSPORTATION INSECURITY
IN THE PAST 12 MONTHS, HAS THE LACK OF TRANSPORTATION KEPT YOU FROM MEDICAL APPOINTMENTS OR FROM GETTING MEDICATIONS?: NO

## 2021-10-29 SDOH — HEALTH STABILITY: PHYSICAL HEALTH: ON AVERAGE, HOW MANY DAYS PER WEEK DO YOU ENGAGE IN MODERATE TO STRENUOUS EXERCISE (LIKE A BRISK WALK)?: 4 DAYS

## 2021-10-29 SDOH — ECONOMIC STABILITY: TRANSPORTATION INSECURITY
IN THE PAST 12 MONTHS, HAS LACK OF TRANSPORTATION KEPT YOU FROM MEETINGS, WORK, OR FROM GETTING THINGS NEEDED FOR DAILY LIVING?: YES

## 2021-10-29 SDOH — HEALTH STABILITY: PHYSICAL HEALTH: ON AVERAGE, HOW MANY MINUTES DO YOU ENGAGE IN EXERCISE AT THIS LEVEL?: 50 MIN

## 2021-10-29 NOTE — PROGRESS NOTES
AVS  Spinal rotation stretches  Continue icy hot and ibuprofen   Magnesium salt mix with water and put on towel on back during shower  Schedule a nurse only visit for vaccinations   Think ab establishing with a pt. I put in an order today  adjustments are called OMT incase you wanted t  No hernia no change is where testicle ride, not sexually active    Sober for two years

## 2021-10-29 NOTE — PROGRESS NOTES
Assessment & Plan     Acute on chronic lumbar strain with spasms  -     Physical Therapy Referral; Future  10-15 year history of intermittent lumbar pain since playing football, though no history of trauma or injury. Presenting today with one month of sharp, pinching pain on the right lateral lumbar back that extends into lateral right glute w/o radiation down leg. Uses 600mg ibuprofen 3x weekly, icy hot and stretches. Continues to work out and stay active, though sometimes the pain limits his ability to stand up straight and walk. Unlikely nerve deficit with intact sensation and no focal weakness or radiation. Most likely lumbar strain and spasms due to compensation of right-sided pelvic tilt. Recommended to continue gentle stretching, heat, ibuprofen prn, icy hot, and soaking towels in epsom salt and placing them over lumber back while showering (no access to tub for epsom salt bath). Recommended PT and/or OMT.  - Physical Therapy referral    Need for vaccination  Needs hep b, flu, covid. Discussed at length. Thinking about it. He will make RN visit.     Hepatitis C virus infection resolved after antiviral drug therapy  Comments:  s/p Mayvret treatment with negative viral loads in 2020 and 2021. Resolved chronic hep C problem in list.     Opioid use disorder, severe, in sustained remission (H)  Comments:  including IVDU, heroin, cocaine, opioids. Sober x 2 years. Lives/works at [facility] [employer].         t significantly limited by social determinants of health - tobacco, hx IVDU, stress  have a direct bearing on their ability to manage their medical conditions.    No follow-ups on file.     Selvin Dubon, MS3  Janice Benitez MD   I was present with the medical student who participated in the service and in the documentation of this note. I have verified the history and personally performed the physical exam and medical decision making, and have verified the content of the note, which accurately reflects my assessment of the  patient and the plan of care.   Janice Benitez MD           The information in this document, created by the medical scribe for me, accurately reflects the services I personally performed and the decisions made by me. I have reviewed and approved this document for accuracy prior to leaving the patient care area.  Janice Benitez MD  7:59 AM, 10/29/21    [facility] ALESSIO Goodwin is a 31 year old who presents for the following health issues:    HPI     Back pain 10-15 years from playing football, no history of injury or trauma. Pain in R lower back that radiates into lateral glute. Pain has always came and went. This particular episode started one month ago. Usually goes away but this time it hasn't. Does stretches, PT one time but far away, was a barrier to going. Hoping for imaging/XR to make sure nothing is wrong with spine.   Sharp pain, like a pinch. Stood up and couldn't stand straight up. Sometimes inhibits walking. Has been working out, being active helps pain.   Ibuprofen 600mg three times a week, icy hot spray/patches help. Has not tried heat.  Groin gets tender with back pain sometimes.  Workout 3-4 times weekly and exercise all muscles daily. Does ab strengthening exercises regularly.  Denies having new sex partners, sharing needles, hernia, or other changes in testicles    Review of Systems   This document serves as a record of the services and decisions personally performed and made by Janice Benitez MD. It was created on his/her behalf by Arcadio Lopez, a trained medical scribe. The creation of this document is based the provider's statements to the medical scribe.  Ismaelibmarilu Lopez 4:41 PM, October 29, 2021        Objective    /76 (BP Location: Left arm, Patient Position: Sitting, Cuff Size: Adult Large)   Pulse 79   Temp 97.9  F (36.6  C) (Oral)   Resp 16   Wt 99 kg (218 lb 3.2 oz)   SpO2 98%   BMI 30.43 kg/m    Body mass index is 30.43 kg/m .   Vitals were  reviewed and were normal.   Physical Exam   GENERAL: healthy, alert and no distress  MS: Right SI and inguinal crease tender to palpation. Trochanteric bursa not tender to palpation. Flex of hips is limited. Ext not limited. No spinal curvature. No lumbar lordosis. Positive hip hike B. Slight pelvic tilt. Right ASIS 1 inch below left. Full strength and ROM in LEs bilaterally (except hip flexion). Negative straight leg raise.  PSYCH: mentation appears normal, affect normal/bright

## 2021-10-29 NOTE — Clinical Note
Thought of 1 more thing. Get used to refreshing the assess/plan - it will then show other orders entered (like need for vaccination). Thanks!   c

## 2021-10-29 NOTE — Clinical Note
BEATRIZ, when you cut and paste exam findings, delete them then from A/P. THanks!   (I already did)  c

## 2021-10-29 NOTE — PATIENT INSTRUCTIONS
Leg/Back Pain  1.Continue with ibuprofen, IcyHot for pain managment  2.Try using heat for relief  3. Practice the spinal rotation stretches we demonstrated today like the spinal twist on both sides  4. Try soaking epsom salts onto a towel and apply after a shower  5. Consider getting in contact with a DO who can help you make slight adjustments, OMT. Your primary care physician is one!! Dr. Hyatt.  6. Set up a referral with a physical therapist with [facility]    Preventative  1. Please consider Flu and Covid vaccinations.  2. Set an appointment for your Hep B vaccine

## 2022-01-30 ENCOUNTER — HEALTH MAINTENANCE LETTER (OUTPATIENT)
Age: 32
End: 2022-01-30

## 2022-09-24 ENCOUNTER — HEALTH MAINTENANCE LETTER (OUTPATIENT)
Age: 32
End: 2022-09-24

## 2022-11-10 ENCOUNTER — TELEPHONE (OUTPATIENT)
Dept: FAMILY MEDICINE | Facility: CLINIC | Age: 32
End: 2022-11-10

## 2022-11-10 NOTE — TELEPHONE ENCOUNTER
RN called pt back. Pt states last weekend started feeling sick. Still feeling sick but a little better.    Pt has occasional cough with phlegm, sore throat and dizzy. Feels off balance and feels lungs are bruised    Had fever of 100.9 on Saturday but nothing since then     Able to schedule for tomorrow afternoon. RN advised urgent care if starts to feel worse or wants to be seen sooner    Frida Sinha RN

## 2022-11-10 NOTE — TELEPHONE ENCOUNTER
[facility] phone call message-patient reporting a symptom:     Symptom: Sore throat/ Cough    Same Day Visit Offered: NO    Additional comments: Patient called and said that he is having symptoms of sore throat that feels brusied, mucus, dizziness and cough. Patient would like a call back from a nurse or PCP. Call back number for patient is [phone].     OK to leave message on voice mail? Yes    Primary language: English      needed? No    Call taken on November 10, 2022 at 10:05 AM by Niharika Guidry

## 2022-11-11 ENCOUNTER — ANCILLARY PROCEDURE (OUTPATIENT)
Dept: GENERAL RADIOLOGY | Facility: CLINIC | Age: 32
End: 2022-11-11
Attending: FAMILY MEDICINE
Payer: COMMERCIAL

## 2022-11-11 ENCOUNTER — OFFICE VISIT (OUTPATIENT)
Dept: FAMILY MEDICINE | Facility: CLINIC | Age: 32
End: 2022-11-11
Payer: COMMERCIAL

## 2022-11-11 VITALS
DIASTOLIC BLOOD PRESSURE: 86 MMHG | SYSTOLIC BLOOD PRESSURE: 132 MMHG | OXYGEN SATURATION: 96 % | TEMPERATURE: 98.1 F | HEART RATE: 82 BPM | RESPIRATION RATE: 16 BRPM

## 2022-11-11 DIAGNOSIS — R05.1 ACUTE COUGH: ICD-10-CM

## 2022-11-11 DIAGNOSIS — R06.02 SHORTNESS OF BREATH: ICD-10-CM

## 2022-11-11 DIAGNOSIS — R05.1 ACUTE COUGH: Primary | ICD-10-CM

## 2022-11-11 DIAGNOSIS — R42 DIZZINESS: ICD-10-CM

## 2022-11-11 LAB
FLUAV RNA SPEC QL NAA+PROBE: NEGATIVE
FLUBV RNA RESP QL NAA+PROBE: NEGATIVE
RSV RNA SPEC NAA+PROBE: NEGATIVE
SARS-COV-2 RNA RESP QL NAA+PROBE: NEGATIVE

## 2022-11-11 PROCEDURE — 87637 SARSCOV2&INF A&B&RSV AMP PRB: CPT | Performed by: STUDENT IN AN ORGANIZED HEALTH CARE EDUCATION/TRAINING PROGRAM

## 2022-11-11 PROCEDURE — 71046 X-RAY EXAM CHEST 2 VIEWS: CPT | Mod: FY | Performed by: RADIOLOGY

## 2022-11-11 PROCEDURE — 99214 OFFICE O/P EST MOD 30 MIN: CPT | Mod: CS | Performed by: STUDENT IN AN ORGANIZED HEALTH CARE EDUCATION/TRAINING PROGRAM

## 2022-11-11 ASSESSMENT — PAIN SCALES - GENERAL: PAINLEVEL: NO PAIN (0)

## 2022-11-11 NOTE — PROGRESS NOTES
Preceptor Attestation:    I discussed the patient with the resident and evaluated the patient in person. I have verified the content of the note, which accurately reflects my assessment of the patient and the plan of care.   Supervising Physician:  Jordan Cardenas MD.

## 2022-11-12 NOTE — PROGRESS NOTES
"  Assessment & Plan     Acute cough  Shortness of breath  Patient with 7 days of symptoms, first 3 days with marked fever, chills, cough and shortness of breath noted, provement of malaise over the next 4 days however has had persistent intermittent shortness of breath feeling like he cannot catch his breath and intermittent pain in his chest that feels like \"my lungs are sore \".  History of smoking, successfully quit 6 months ago, has not felt like this with subsequent URIs after discontinuing smoking.  Most likely viral syndrome although which viral is not clear, ordered COVID/influenza/RSV symptomatic PCR which was collected in clinic.  Also obtained chest x-ray 2 views to rule out superimposed pneumonia, which was reviewed with attending Dr. Jordan Cardenas.  Unclear cause of increased lucency, awaiting radiologist official read.  Lung markings extend to border through area of increased lucency in LLL. No signs of pneumonia at this time, heart is appropriate size and shape with no findings suggestive of pericardial effusion, lung markings extend to ribs and diaphragm arguing against pneumothorax. Considered bleb.  Strong return and hospital precautions given, message sent to patient discussing my read and that we are awaiting radiologist read for official.  I will watch for this to come back tonight and tomorrow.  If pain worsens, if pain persists and does not dorothy would advise that patient present to emergency department for urgent evaluation in the interim.  - Symptomatic; Unknown Influenza A/B & SARS-CoV2 (COVID-19) Virus PCR Multiplex Nose  - XR Chest 2 Views; Future    Dizziness  Intermittent, comes on with shortness of breath, not present during exam per his report.  Blood pressure looks well, not tachycardic, satting 96% on room air and speaking easily in full sentences.  Goes from slouching to sitting to standing quickly and without restriction.        No follow-ups on file.    Nancy Edwards, " MD [facility] ALESSIO Goodwin is a 32 year old, presenting for the following health issues:  Cough (SOB last Friday. Chest is painful. Worse with exertion. Worse when laying down. Took COVID test 3 days ago, was negative. No flu test. ) and Dizziness (Started Monday night. Has been like that all week. )      HPI     Patient with high exposure, starting 1 week ago developed shortness of breath.  Over the next 3 days had fevers, chills, coughing bringing up sputum, soreness with coughing, significant fatigue and malaise.  Some improvement of symptoms over the next few days till now however has had persistent shortness of breath, intermittent pain in his chest that is worse with exertion and laying flat until it passes.  3 days prior to presentation to go home COVID test that was negative.  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    No bloody sputum, no darker or thicker sputum, no fevers or chills for the last 4 days.    Review of Systems   See HPI      Objective    /86   Pulse 82   Temp 98.1  F (36.7  C) (Oral)   Resp 16   SpO2 96%   There is no height or weight on file to calculate BMI.  Physical Exam   Vital signs reviewed  Constitutional: Age appropriate human well kempt, no acute distress  HENT: Sclera non-icteric and not injected, pink conjunctivae, MMmoist and no tonsil edema or exudate, mild posterior oropharynx cobbling consistent with post nasal drip  Cardiac: Regular rate  Resp: Speaking in full sentences on room air, no respiratory distress, no crackles or wheezes appreciated, breath sounds to bases bilaterally, no cough during exam  Skin: Warm, dry   Msk: Ambulates independently, full range of gesture  Neuro: Alert and oriented, movements coordinated and symmetric, appropriate gait  Psych: Affect appropriate to conversation and situation       CXR not suggestive of bacterial pneumonia and not showing signs of consolidation. Decreased  opacity noted to lower left field, with appreciated pleural markings that argue against pneumothorax. Will await official radiologist read.

## 2022-11-30 PROBLEM — B18.2 CHRONIC HEPATITIS C WITHOUT HEPATIC COMA (H): Status: ACTIVE | Noted: 2022-11-30

## 2022-12-01 ENCOUNTER — OFFICE VISIT (OUTPATIENT)
Dept: FAMILY MEDICINE | Facility: CLINIC | Age: 32
End: 2022-12-01
Payer: COMMERCIAL

## 2022-12-01 VITALS
WEIGHT: 191.6 LBS | OXYGEN SATURATION: 95 % | HEART RATE: 80 BPM | SYSTOLIC BLOOD PRESSURE: 130 MMHG | DIASTOLIC BLOOD PRESSURE: 76 MMHG | BODY MASS INDEX: 26.72 KG/M2 | TEMPERATURE: 98.8 F

## 2022-12-01 DIAGNOSIS — Z00.00 ROUTINE GENERAL MEDICAL EXAMINATION AT A HEALTH CARE FACILITY: Primary | ICD-10-CM

## 2022-12-01 DIAGNOSIS — F11.21 OPIOID USE DISORDER, SEVERE, IN SUSTAINED REMISSION (H): ICD-10-CM

## 2022-12-01 DIAGNOSIS — Z11.3 SCREENING EXAMINATION FOR SEXUALLY TRANSMITTED DISEASE: ICD-10-CM

## 2022-12-01 DIAGNOSIS — B18.2 CHRONIC HEPATITIS C WITHOUT HEPATIC COMA (H): ICD-10-CM

## 2022-12-01 DIAGNOSIS — A63.0 GENITAL WARTS: ICD-10-CM

## 2022-12-01 DIAGNOSIS — N63.21 MASS OF UPPER OUTER QUADRANT OF LEFT BREAST: ICD-10-CM

## 2022-12-01 PROCEDURE — 87591 N.GONORRHOEAE DNA AMP PROB: CPT | Performed by: STUDENT IN AN ORGANIZED HEALTH CARE EDUCATION/TRAINING PROGRAM

## 2022-12-01 PROCEDURE — 86780 TREPONEMA PALLIDUM: CPT | Performed by: STUDENT IN AN ORGANIZED HEALTH CARE EDUCATION/TRAINING PROGRAM

## 2022-12-01 PROCEDURE — 87491 CHLMYD TRACH DNA AMP PROBE: CPT | Performed by: STUDENT IN AN ORGANIZED HEALTH CARE EDUCATION/TRAINING PROGRAM

## 2022-12-01 PROCEDURE — 36415 COLL VENOUS BLD VENIPUNCTURE: CPT | Performed by: STUDENT IN AN ORGANIZED HEALTH CARE EDUCATION/TRAINING PROGRAM

## 2022-12-01 PROCEDURE — 99214 OFFICE O/P EST MOD 30 MIN: CPT | Mod: 25 | Performed by: STUDENT IN AN ORGANIZED HEALTH CARE EDUCATION/TRAINING PROGRAM

## 2022-12-01 PROCEDURE — 87389 HIV-1 AG W/HIV-1&-2 AB AG IA: CPT | Performed by: STUDENT IN AN ORGANIZED HEALTH CARE EDUCATION/TRAINING PROGRAM

## 2022-12-01 PROCEDURE — 99395 PREV VISIT EST AGE 18-39: CPT | Mod: GC | Performed by: STUDENT IN AN ORGANIZED HEALTH CARE EDUCATION/TRAINING PROGRAM

## 2022-12-01 RX ORDER — IMIQUIMOD 12.5 MG/.25G
CREAM TOPICAL
Qty: 12 PACKET | Refills: 3 | Status: SHIPPED | OUTPATIENT
Start: 2022-12-01 | End: 2024-04-03

## 2022-12-01 ASSESSMENT — ENCOUNTER SYMPTOMS
HEADACHES: 0
DIARRHEA: 0
ARTHRALGIAS: 0
CONSTIPATION: 0
PARESTHESIAS: 0
ABDOMINAL PAIN: 0
MYALGIAS: 1
FEVER: 0
WEAKNESS: 0
NERVOUS/ANXIOUS: 1
SHORTNESS OF BREATH: 0
DYSURIA: 0
JOINT SWELLING: 0
CHILLS: 1
HEARTBURN: 1
FREQUENCY: 0
HEMATURIA: 0
HEMATOCHEZIA: 0
PALPITATIONS: 0
SORE THROAT: 0
DIZZINESS: 0
NAUSEA: 0
COUGH: 1
EYE PAIN: 0

## 2022-12-01 NOTE — PROGRESS NOTES
SUBJECTIVE:   CC: Buddy is an 32 year old who presents for preventative health visit.     Patient has been advised of split billing requirements and indicates understanding: Yes  Healthy Habits:   PHQ-2 Total Score: 0    Lump on upper left, noticed it a few months ago. Not painful. No similar bumps. No injury to the area. No overlying skin changes. Worried because mom had breast cancer    Almost 3 years sober  Has trouble gaining muscle.     Getting sick about 1x monthly since August. Works as a  - [employer].     Mood:  - has been getting bad anxiety recently   - just started working out again, this week  - mood gets better when exercising  - in a new relationship    Requesting STI check  - had a wart on his leg  - has a wart on his penis, painful with sexual activity  - no bleeding or discharge  - wondering about getting HPV vaccine    Today's PHQ-2 Score:   PHQ-2 (  [employer]) 2022   Q1: Little interest or pleasure in doing things 0   Q2: Feeling down, depressed or hopeless 0   PHQ-2 Score 0   PHQ-2 Total Score (12-17 Years)- Positive if 3 or more points; Administer PHQ-A if positive -   Q1: Little interest or pleasure in doing things Not at all   Q2: Feeling down, depressed or hopeless Not at all   PHQ-2 Score 0       Have you ever done Advance Care Planning? (For example, a Health Directive, POLST, or a discussion with a medical provider or your loved ones about your wishes): No, advance care planning information given to patient to review.  Patient declined advance care planning discussion at this time.    Social History     Tobacco Use     Smoking status: Former     Types: Cigarettes     Quit date: 6/15/2022     Years since quittin.4     Smokeless tobacco: Never   Substance Use Topics     Alcohol use: Not Currently     If you drink alcohol do you typically have >3 drinks per day or >7 drinks per week? Not applicable    Alcohol Use 2022   Prescreen: >3 drinks/day or >7  drinks/week? No   Prescreen: >3 drinks/day or >7 drinks/week? -   No flowsheet data found.    Last PSA: No results found for: PSA    Reviewed orders with patient. Reviewed health maintenance and updated orders accordingly - Yes      Reviewed and updated as needed this visit by clinical staff    Allergies  Meds              Reviewed and updated as needed this visit by Provider     Meds             ROS  Answers for HPI/ROS submitted by the patient on 12/1/2022  abdominal pain: No  Blood in stool: No  Blood in urine: No  chest pain: Yes  chills: Yes  congestion: No  constipation: No  cough: Yes  diarrhea: No  dizziness: No  ear pain: No  eye pain: No  nervous/anxious: Yes  fever: No  frequency: No  genital sores: No  headaches: No  hearing loss: No  heartburn: Yes  arthralgias: No  joint swelling: No  peripheral edema: No  mood changes: Yes  myalgias: Yes  nausea: No  dysuria: No  palpitations: No  Skin sensation changes: No  sore throat: No  urgency: No  rash: No  shortness of breath: No  visual disturbance: No  weakness: No  impotence: No  penile discharge: No      OBJECTIVE:   /76   Pulse 80   Temp 98.8  F (37.1  C) (Oral)   Wt 86.9 kg (191 lb 9.6 oz)   SpO2 95%   BMI 26.72 kg/m      Physical Exam  Constitutional:       General: He is not in acute distress.     Appearance: Normal appearance. He is not ill-appearing.   HENT:      Head: Normocephalic and atraumatic.      Nose: No congestion or rhinorrhea.   Eyes:      Conjunctiva/sclera: Conjunctivae normal.   Cardiovascular:      Rate and Rhythm: Normal rate and regular rhythm.      Heart sounds: Normal heart sounds. No murmur heard.    No friction rub. No gallop.   Pulmonary:      Effort: Pulmonary effort is normal.      Breath sounds: Normal breath sounds. No wheezing, rhonchi or rales.   Chest:          Comments: Approximately 1cm round deep nodule in left upper chest, slightly mobile, slightly tender. No overlyin skin changes  Genitourinary:          Comments: Small <1cm raised round skin colored verrucous lesion on penis without ulceration or discharge  Musculoskeletal:      Right lower leg: No edema.      Left lower leg: No edema.   Lymphadenopathy:      Cervical: No cervical adenopathy.      Upper Body:      Right upper body: No supraclavicular or axillary adenopathy.      Left upper body: No supraclavicular or axillary adenopathy.   Skin:     General: Skin is warm and dry.   Neurological:      Mental Status: He is alert.   Psychiatric:         Mood and Affect: Mood normal.         Behavior: Behavior normal.         Thought Content: Thought content normal.         Judgment: Judgment normal.         ASSESSMENT/PLAN:   Buddy was seen today for physical.    Diagnoses and all orders for this visit:    Routine general medical examination at a health care facility    Mass of upper outer quadrant of left breast  New mildly tender deep small round mass at left upper chest that he first noticed about 2 months ago. No overlying skin changes, axillary adenopathy, nipple discharge, or gynecomastia. Suspect lipoma by exam and history. Has a family history of breast cancer in mother at age 40, so mammogram ordered. May need ultrasound instead per radiologist recommendation if not able to image based on location.   -     Diagnostic Mammogram Digital Bilateral; Future    Genital warts  History of genital warts s/p excision. Has a single verrucous lesion on penis shaft <1cm. Discussed options of cryotherapy in clinic vs imiquimod topical therapy at home, and patient requests imiquimod. Apply three times weekly (Mon/Wed/Fri) until resolution of wart with maximum of 16 weeks. Discussed HPV vaccine for preventing bhanu cancer-causing strains of HPV, not for treating or preventing spread of the HPV that he already has.   -     imiquimod (ALDARA) 5 % external cream; Apply a small sized amount to warts or molluscum three times weekly at bedtime.   Wash off after 8 hours.    May use for up to 16 weeks.    Screening examination for sexually transmitted disease  Not symptomatic.  -     HIV Antigen Antibody Combo; Future  -     Treponema Abs w Reflex to RPR and Titer; Future  -     NEISSERIA GONORRHOEA PCR  -     CHLAMYDIA TRACHOMATIS PCR    Chronic hepatitis C without hepatic coma (H)  Treated to cure. HCV RNA last checked 3/1/21.    Opioid use disorder, severe, in sustained remission (H)  Sober for almost 3 years.       Patient has been advised of split billing requirements and indicates understanding: Yes      COUNSELING:   Reviewed preventive health counseling, as reflected in patient instructions        He reports that he quit smoking about 5 months ago. His smoking use included cigarettes. He has never used smokeless tobacco.      Deisy Nye MD  [facility]

## 2022-12-01 NOTE — PATIENT INSTRUCTIONS
Patient Education     Thank you for coming to [facility] today!  Here is the plan from today's visit:    The lump in your chest is probably a lipoma (non-cancerous fatty growth). We are getting imaging to make sure it isn't cancer.    Start the treatment for your genital wart. Regarding the HPV vaccine, you can check with your insurance company to see if they would cover it at your age. You can set up a nurse-only appointment if you end up wanting to get it.     Avoid sex while you have the wart to help prevent spreading HPV to your partner. The most important thing for your partner to prevent cervical cancer is to have regular pap smears. She may also be able to get the HPV vaccine if she hasn't already.     Follow up plan  No follow-ups on file.    Lab Testing:  **If you had lab testing today and your results are reassuring or normal they will be mailed to you or sent through [employer] within 7 days.   **If the lab tests need quick action we will call you with the results.  **If you are having labs done on a different day, please call [phone] to schedule at [facility] or [phone] for other [facility] locations. Labs do not offer walk-in appointments.  The phone number we will call with results is # [phone] (home) . If this is not the best number please call our clinic and change the number.  Medication Refills:  If you need any refills please call your pharmacy and they will contact us.   If you need to  your refill at a new pharmacy, please contact the new pharmacy directly. The new pharmacy will help you get your medications transferred faster.   Scheduling:  If you have any concerns about today's visit or wish to schedule another appointment please call our office during normal business hours [phone] (8-5:00 M-F)  If a referral was made to an [facility] specialty provider and you do not get a call from central scheduling, please refer to directions on  your visit summary or call our office during normal business hours for assistance.   If a Mammogram was ordered for you at the [facility] Center call [phone] to schedule or change your appointment.  If you had an XRay/CT/Ultrasound/MRI ordered the number is [phone] to schedule or change your radiology appointment.   [facility] has limited ultrasound appointments available on Wednesdays, if you would like your ultrasound at [facility], please call [phone] to schedule.   Medical Concerns:  If you have urgent medical concerns please call [phone] at any time of the day.    Deisy Nye MD      Preventive Health Recommendations  Male Ages 26 - 39    Yearly exam:             See your health care provider every year in order to  o   Review health changes.   o   Discuss preventive care.    o   Review your medicines if your doctor has prescribed any.  You should be tested each year for STDs (sexually transmitted diseases), if you re at risk.   After age 35, talk to your provider about cholesterol testing. If you are at risk for heart disease, have your cholesterol tested at least every 5 years.   If you are at risk for diabetes, you should have a diabetes test (fasting glucose).  Shots: Get a flu shot each year. Get a tetanus shot every 10 years.     Nutrition:  Eat at least 5 servings of fruits and vegetables daily.   Eat whole-grain bread, whole-wheat pasta and brown rice instead of white grains and rice.   Get adequate Calcium and Vitamin D.     Lifestyle  Exercise for at least 150 minutes a week (30 minutes a day, 5 days a week). This will help you control your weight and prevent disease.   Limit alcohol to one drink per day.   No smoking.   Wear sunscreen to prevent skin cancer.   See your dentist every six months for an exam and cleaning.

## 2022-12-02 LAB
C TRACH DNA SPEC QL NAA+PROBE: NEGATIVE
HIV 1+2 AB+HIV1 P24 AG SERPL QL IA: NONREACTIVE
N GONORRHOEA DNA SPEC QL NAA+PROBE: NEGATIVE
T PALLIDUM AB SER QL: NONREACTIVE

## 2022-12-12 ENCOUNTER — ANCILLARY PROCEDURE (OUTPATIENT)
Dept: MAMMOGRAPHY | Facility: CLINIC | Age: 32
End: 2022-12-12
Attending: FAMILY MEDICINE
Payer: COMMERCIAL

## 2022-12-12 DIAGNOSIS — N63.21 MASS OF UPPER OUTER QUADRANT OF LEFT BREAST: ICD-10-CM

## 2022-12-12 DIAGNOSIS — N63.21 MASS OF UPPER OUTER QUADRANT OF LEFT BREAST: Primary | ICD-10-CM

## 2022-12-12 PROCEDURE — 77066 DX MAMMO INCL CAD BI: CPT | Performed by: RADIOLOGY

## 2022-12-12 PROCEDURE — 76642 ULTRASOUND BREAST LIMITED: CPT | Mod: LT | Performed by: RADIOLOGY

## 2023-06-12 ENCOUNTER — ANCILLARY PROCEDURE (OUTPATIENT)
Dept: MAMMOGRAPHY | Facility: CLINIC | Age: 33
End: 2023-06-12
Attending: STUDENT IN AN ORGANIZED HEALTH CARE EDUCATION/TRAINING PROGRAM
Payer: COMMERCIAL

## 2023-06-12 DIAGNOSIS — N63.21 MASS OF UPPER OUTER QUADRANT OF LEFT BREAST: ICD-10-CM

## 2023-06-12 PROCEDURE — 76642 ULTRASOUND BREAST LIMITED: CPT | Mod: LT

## 2024-02-25 ENCOUNTER — HEALTH MAINTENANCE LETTER (OUTPATIENT)
Age: 34
End: 2024-02-25

## 2024-03-26 SDOH — HEALTH STABILITY: PHYSICAL HEALTH: ON AVERAGE, HOW MANY MINUTES DO YOU ENGAGE IN EXERCISE AT THIS LEVEL?: 20 MIN

## 2024-03-26 SDOH — HEALTH STABILITY: PHYSICAL HEALTH: ON AVERAGE, HOW MANY DAYS PER WEEK DO YOU ENGAGE IN MODERATE TO STRENUOUS EXERCISE (LIKE A BRISK WALK)?: 4 DAYS

## 2024-03-26 ASSESSMENT — SOCIAL DETERMINANTS OF HEALTH (SDOH): HOW OFTEN DO YOU GET TOGETHER WITH FRIENDS OR RELATIVES?: MORE THAN THREE TIMES A WEEK

## 2024-03-26 NOTE — COMMUNITY RESOURCES LIST (ENGLISH)
March 26, 2024           YOUR PERSONALIZED LIST OF SERVICES & PROGRAMS           & SHELTER    Housing      Free - Client Services  [address] [place] [address] (Distance: 16.9 miles)  Phone: [phone]  Website: [email or web address] Link/  Language: English  Fee: Free  Transportation Options: Free transportation      [facility] skilled nursing  Phone: ([phone]  Website: https://www.StyleQ.org/  Language: English      [facility] - Housing Stabilization Services  Phone: (206) 327-9370  Website: https://[employer]ogi/Housing-Stabilization.html  Language: English  Hours: Mon 9:00 AM - 5:00 PM Tue 9:00 AM - 5:00 PM Wed 9:00 AM - 5:00 PM Thu 9:00 AM - 5:00 PM Fri 9:00 AM - 5:00 PM  Fee: Insurance  Accessibility: Deaf or hard of hearing, Translation services    Case Management      [employer] - Housing Stabilization Services  [address] [place] [address] (Distance: 10.2 miles)  Phone: ([phone]) [phone]  Website: https://www.opportunitycsiexerci.se.org/  Language: German, English, Somali, Hmong, Thai, Grenadian  Fee: Insurance  Accessibility: Ada accessible, Blind accommodation, Deaf or hard of hearing, Translation services  Transportation Options: Free transportation      Living - Housing Stabilization Services  [address] [place] [address] (Distance: 23.5 miles)  Phone: (656) [phone]  Website: https://[employer]i.[employer]  Language: German, English, Thai  Fee: Insurance, Self pay      [employer] - Housing Stabilization Services  Phone: (139) 103-5736  Website: https://homebasemn.com/  Language: English  Hours: Mon 8:00 AM - 4:00 PM Tue 8:00 AM - 4:00 PM Wed 8:00 AM - 4:00 PM Thu 8:00 AM - 4:00 PM Fri 8:00 AM - 4:00 PM  Fee: Free  Accessibility: Blind accommodation, Deaf or hard of hearing  Transportation Options: Free transportation    Drop-In Services      [facility] Warming or cooling center  [address]  [place] [address] (Distance: 10.3 miles)  Language: English  Fee: Free      [facility] Warming or cooling center  [address] [place] [address] (Distance: 10.3 miles)  Language: English, Slovenian, Gunnar, Telugu, Nepalese, Hmong, Tamil  Fee: Free      [employer] LAUNDRY LOVE  Website: http://www.laundrylove.org               IMPORTANT NUMBERS & WEBSITES        Emergency Services  911  .   United Way  211 http://211unitedway.org  .   Poison Control  (109) 400-8049 http://[place]poison.org http://wis[place]poison.org  .     Suicide and Crisis Lifeline  988 http://988Oysterline.org  .   Childhelp [place] Child Abuse Hotline  779.526.6195 http://Childhelphotline.org   .   [place] Sexual Assault Hotline  (233) 625-5119 (HOPE) http://"[employer]n.org   .     [place] Runaway Safeline  (216) 339-9600 (RUNAWAY) http://[employer].org  .   Pregnancy & Postpartum Support  Call/text 600-645-4690  [place]: http://ppsupport[place].org  [place]: http://BioMCN.com/wi  .   Substance Abuse National Helpline ([facility])  720-413-HELP (6328) http://Findtreatment.gov   .                DISCLAIMER: [employer] does not endorse any service providers mentioned in this resource list. [employer] does not guarantee that the services mentioned in this resource list will be available to you or will improve your health or wellness.    [facility]

## 2024-04-02 ENCOUNTER — OFFICE VISIT (OUTPATIENT)
Dept: FAMILY MEDICINE | Facility: CLINIC | Age: 34
End: 2024-04-02
Payer: COMMERCIAL

## 2024-04-02 VITALS
OXYGEN SATURATION: 96 % | DIASTOLIC BLOOD PRESSURE: 79 MMHG | WEIGHT: 225.4 LBS | HEART RATE: 79 BPM | BODY MASS INDEX: 31.56 KG/M2 | SYSTOLIC BLOOD PRESSURE: 124 MMHG | HEIGHT: 71 IN | RESPIRATION RATE: 12 BRPM | TEMPERATURE: 98.3 F

## 2024-04-02 DIAGNOSIS — R63.5 WEIGHT GAIN: ICD-10-CM

## 2024-04-02 DIAGNOSIS — R10.9 FLANK PAIN: ICD-10-CM

## 2024-04-02 DIAGNOSIS — R82.90 ABNORMAL URINE ODOR: ICD-10-CM

## 2024-04-02 DIAGNOSIS — Z00.01 ENCOUNTER FOR ROUTINE ADULT HEALTH EXAMINATION WITH ABNORMAL FINDINGS: Primary | ICD-10-CM

## 2024-04-02 LAB
ALBUMIN UR-MCNC: NEGATIVE MG/DL
APPEARANCE UR: CLEAR
BILIRUB UR QL STRIP: NEGATIVE
COLOR UR AUTO: YELLOW
GLUCOSE UR STRIP-MCNC: NEGATIVE MG/DL
HBA1C MFR BLD: 5.4 % (ref 0–5.6)
HGB UR QL STRIP: NEGATIVE
KETONES UR STRIP-MCNC: NEGATIVE MG/DL
LEUKOCYTE ESTERASE UR QL STRIP: NEGATIVE
NITRATE UR QL: NEGATIVE
PH UR STRIP: 6.5 [PH] (ref 5–8)
SP GR UR STRIP: 1.01 (ref 1–1.03)
UROBILINOGEN UR STRIP-ACNC: 0.2 E.U./DL

## 2024-04-02 PROCEDURE — 99213 OFFICE O/P EST LOW 20 MIN: CPT | Mod: 25 | Performed by: FAMILY MEDICINE

## 2024-04-02 PROCEDURE — 83036 HEMOGLOBIN GLYCOSYLATED A1C: CPT | Performed by: FAMILY MEDICINE

## 2024-04-02 PROCEDURE — 99395 PREV VISIT EST AGE 18-39: CPT | Performed by: FAMILY MEDICINE

## 2024-04-02 PROCEDURE — 80061 LIPID PANEL: CPT | Performed by: FAMILY MEDICINE

## 2024-04-02 PROCEDURE — 36415 COLL VENOUS BLD VENIPUNCTURE: CPT | Performed by: FAMILY MEDICINE

## 2024-04-02 PROCEDURE — 81003 URINALYSIS AUTO W/O SCOPE: CPT | Performed by: FAMILY MEDICINE

## 2024-04-02 PROCEDURE — 84443 ASSAY THYROID STIM HORMONE: CPT | Performed by: FAMILY MEDICINE

## 2024-04-02 PROCEDURE — 80053 COMPREHEN METABOLIC PANEL: CPT | Performed by: FAMILY MEDICINE

## 2024-04-02 NOTE — PROGRESS NOTES
"Preventive Care Visit  [facility] ALESSIO Hyatt DO, Family Medicine  2024      Assessment & Plan     Encounter for routine adult health examination with abnormal findings    Flank pain  Hx of chronic LBP. Likely musculoskeletal. Has seen ortho at [facility] previously, advised to return for further imaging if pain persistent after PT. Did complete some PT. Also concerned about kidneys and liver - findings not consistent w/ this, but having some changes in urinary odor and hx of hep C in remission. Ordered labs and referred back to previous ortho provider.  - Comprehensive metabolic panel; Future  - Comprehensive metabolic panel    Weight gain  Weight gain over the last year, likely lifestyle and stress related -  baby 3 weeks ago, excited and going well.  - TSH with free T4 reflex; Future  - Hemoglobin A1c; Future  - Lipid panel; Future  - TSH with free T4 reflex  - Hemoglobin A1c  - Lipid panel    Abnormal urine odor  - UA Macroscopic with reflex to Microscopic and Culture; Future  - Hemoglobin A1c; Future  - UA Macroscopic with reflex to Microscopic and Culture  - Hemoglobin A1c      BMI  Estimated body mass index is 31.44 kg/m  as calculated from the following:    Height as of this encounter: 1.803 m (5' 11\").    Weight as of this encounter: 102.2 kg (225 lb 6.4 oz).   Weight management plan: Discussed healthy diet and exercise guidelines. Labs ordered.    Counseling  Appropriate preventive services were discussed with this patient, including applicable screening as appropriate for fall prevention, nutrition, physical activity, Tobacco-use cessation, weight loss and cognition.  Checklist reviewing preventive services available has been given to the patient.    No follow-ups on file.    Jameson Goodwin is a 33 year old, presenting for the following:  Physical (Right side pain)          2024     1:05 PM   Additional Questions   Roomed by melvin   Accompanied by self "         4/2/2024    Information    services provided? No        Health Care Directive  Patient does not have a Health Care Directive or Living Will: Discussed advance care planning with patient; however, patient declined at this time.    HPI    R side pain  Burning/stinging/throbbing under ribs on the R side, comes and goes  Lower R back/buttock pain with radiation down back of R leg, back pain has been present for ~10 years  Has tried PT, chiro  Has been getting 'sick' lately and fatigued  History of Hep C, treated 4 years ago        3/26/2024   General Health   How would you rate your overall physical health? Good   Feel stress (tense, anxious, or unable to sleep) Only a little   (!) STRESS CONCERN      3/26/2024   Nutrition   Three or more servings of calcium each day? (!) NO   Diet: Breakfast skipped   How many servings of fruit and vegetables per day? (!) 2-3   How many sweetened beverages each day? (!) 2         3/26/2024   Exercise   Days per week of moderate/strenous exercise 4 days   Average minutes spent exercising at this level 20 min         3/26/2024   Social Factors   Frequency of gathering with friends or relatives More than three times a week   Worry food won't last until get money to buy more No   Food not last or not have enough money for food? No   Do you have housing?  No   Are you worried about losing your housing? No   Lack of transportation? No   Unable to get utilities (heat,electricity)? No   Want help with housing or utility concern? No   (!) HOUSING CONCERN PRESENT      3/26/2024   Dental   Dentist two times every year? Yes         3/26/2024   TB Screening   Were you born outside of the [place]? No         Today's PHQ-2 Score:       4/2/2024     1:00 PM   PHQ-2 ( 1999 [employer])   Q1: Little interest or pleasure in doing things 0   Q2: Feeling down, depressed or hopeless 0   PHQ-2 Score 0   Q1: Little interest or pleasure in doing things Not at all   Q2: Feeling down,  "depressed or hopeless Not at all   PHQ-2 Score 0           3/26/2024   Substance Use   Alcohol more than 3/day or more than 7/wk Not Applicable   Do you use any other substances recreationally? No     Social History     Tobacco Use    Smoking status: Former     Types: Cigarettes     Quit date: 6/15/2022     Years since quittin.8    Smokeless tobacco: Never   Vaping Use    Vaping Use: Never used   Substance Use Topics    Alcohol use: Not Currently    Drug use: Not Currently           3/26/2024   STI Screening   New sexual partner(s) since last STI/HIV test? No         3/26/2024   Contraception/Family Planning   Questions about contraception or family planning No        Reviewed and updated as needed this visit by Provider     Meds  Problems                 Review of Systems  Constitutional, HEENT, cardiovascular, pulmonary, gi and gu systems are negative, except as otherwise noted.     Objective    Exam  /79 (BP Location: Right arm, Patient Position: Sitting, Cuff Size: Adult Large)   Pulse 79   Temp 98.3  F (36.8  C) (Oral)   Resp 12   Ht 1.803 m (5' 11\")   Wt 102.2 kg (225 lb 6.4 oz)   SpO2 96%   BMI 31.44 kg/m     Estimated body mass index is 31.44 kg/m  as calculated from the following:    Height as of this encounter: 1.803 m (5' 11\").    Weight as of this encounter: 102.2 kg (225 lb 6.4 oz).    Physical Exam  GENERAL: alert and no distress  EYES: Eyes grossly normal to inspection, PERRL and conjunctivae and sclerae normal  HENT: ear canals and TM's normal, nose and mouth without ulcers or lesions  NECK: no adenopathy, no asymmetry, masses, or scars  RESP: lungs clear to auscultation - no rales, rhonchi or wheezes  CV: regular rate and rhythm, normal S1 S2, no S3 or S4, no murmur, click or rub, no peripheral edema  ABDOMEN: soft, nontender, no hepatosplenomegaly, no masses and bowel sounds normal  MS: Tenderness over bilateral lumbar paraspinal muscles, CVA tap causes radiation toward R SI " joint. Tender over R SI and R piriformis. Strength and sensation grossly intact.  SKIN: subcutaneous, circumscribed nodule without surrounding skin changes, mildly tender, L flank  NEURO: Normal strength and tone, mentation intact and speech normal  PSYCH: mentation appears normal, affect normal/bright        Signed Electronically by: Adriana Hyatt DO

## 2024-04-03 LAB
ALBUMIN SERPL BCG-MCNC: 4.9 G/DL (ref 3.5–5.2)
ALP SERPL-CCNC: 69 U/L (ref 40–150)
ALT SERPL W P-5'-P-CCNC: 23 U/L (ref 0–70)
ANION GAP SERPL CALCULATED.3IONS-SCNC: 10 MMOL/L (ref 7–15)
AST SERPL W P-5'-P-CCNC: 24 U/L (ref 0–45)
BILIRUB SERPL-MCNC: 0.6 MG/DL
BUN SERPL-MCNC: 12 MG/DL (ref 6–20)
CALCIUM SERPL-MCNC: 9.8 MG/DL (ref 8.6–10)
CHLORIDE SERPL-SCNC: 99 MMOL/L (ref 98–107)
CHOLEST SERPL-MCNC: 197 MG/DL
CREAT SERPL-MCNC: 0.95 MG/DL (ref 0.67–1.17)
DEPRECATED HCO3 PLAS-SCNC: 28 MMOL/L (ref 22–29)
EGFRCR SERPLBLD CKD-EPI 2021: >90 ML/MIN/1.73M2
FASTING STATUS PATIENT QL REPORTED: YES
GLUCOSE SERPL-MCNC: 93 MG/DL (ref 70–99)
HDLC SERPL-MCNC: 49 MG/DL
LDLC SERPL CALC-MCNC: 129 MG/DL
NONHDLC SERPL-MCNC: 148 MG/DL
POTASSIUM SERPL-SCNC: 4.4 MMOL/L (ref 3.4–5.3)
PROT SERPL-MCNC: 7.6 G/DL (ref 6.4–8.3)
SODIUM SERPL-SCNC: 137 MMOL/L (ref 135–145)
TRIGL SERPL-MCNC: 95 MG/DL
TSH SERPL DL<=0.005 MIU/L-ACNC: 0.81 UIU/ML (ref 0.3–4.2)

## 2024-04-03 NOTE — PATIENT INSTRUCTIONS
Followup with ortho at [facility], call to schedule: [phone]. Contact [facility] if new referral needed.  Labs ordered, [facility] and [facility] for follow[facility].

## 2025-03-27 ENCOUNTER — PATIENT OUTREACH (OUTPATIENT)
Dept: CARE COORDINATION | Facility: CLINIC | Age: 35
End: 2025-03-27
Payer: COMMERCIAL

## 2025-05-17 ENCOUNTER — HEALTH MAINTENANCE LETTER (OUTPATIENT)
Age: 35
End: 2025-05-17

## 2025-07-04 SDOH — HEALTH STABILITY: PHYSICAL HEALTH: ON AVERAGE, HOW MANY DAYS PER WEEK DO YOU ENGAGE IN MODERATE TO STRENUOUS EXERCISE (LIKE A BRISK WALK)?: 6 DAYS

## 2025-07-04 SDOH — HEALTH STABILITY: PHYSICAL HEALTH: ON AVERAGE, HOW MANY MINUTES DO YOU ENGAGE IN EXERCISE AT THIS LEVEL?: 150+ MIN

## 2025-07-04 ASSESSMENT — SOCIAL DETERMINANTS OF HEALTH (SDOH): HOW OFTEN DO YOU GET TOGETHER WITH FRIENDS OR RELATIVES?: ONCE A WEEK

## 2025-07-09 ENCOUNTER — OFFICE VISIT (OUTPATIENT)
Dept: INTERNAL MEDICINE | Facility: CLINIC | Age: 35
End: 2025-07-09
Payer: COMMERCIAL

## 2025-07-09 VITALS
HEIGHT: 71 IN | SYSTOLIC BLOOD PRESSURE: 126 MMHG | BODY MASS INDEX: 28.82 KG/M2 | HEART RATE: 81 BPM | OXYGEN SATURATION: 97 % | TEMPERATURE: 97 F | RESPIRATION RATE: 20 BRPM | DIASTOLIC BLOOD PRESSURE: 78 MMHG | WEIGHT: 205.9 LBS

## 2025-07-09 DIAGNOSIS — F11.21 OPIOID USE DISORDER, SEVERE, IN SUSTAINED REMISSION (H): ICD-10-CM

## 2025-07-09 DIAGNOSIS — E78.5 HYPERLIPIDEMIA, UNSPECIFIED HYPERLIPIDEMIA TYPE: ICD-10-CM

## 2025-07-09 DIAGNOSIS — Z00.00 ENCOUNTER FOR ANNUAL PHYSICAL EXAM: Primary | ICD-10-CM

## 2025-07-09 DIAGNOSIS — D17.30 LIPOMA OF SKIN AND SUBCUTANEOUS TISSUE: ICD-10-CM

## 2025-07-09 DIAGNOSIS — Z86.19 HEPATITIS C VIRUS INFECTION RESOLVED AFTER ANTIVIRAL DRUG THERAPY: ICD-10-CM

## 2025-07-09 DIAGNOSIS — M54.50 LUMBAR BACK PAIN: ICD-10-CM

## 2025-07-09 LAB
ALBUMIN SERPL BCG-MCNC: 4.6 G/DL (ref 3.5–5.2)
ALP SERPL-CCNC: 58 U/L (ref 40–150)
ALT SERPL W P-5'-P-CCNC: 17 U/L (ref 0–70)
ANION GAP SERPL CALCULATED.3IONS-SCNC: 9 MMOL/L (ref 7–15)
AST SERPL W P-5'-P-CCNC: 26 U/L (ref 0–45)
BILIRUB SERPL-MCNC: 0.7 MG/DL
BUN SERPL-MCNC: 12.9 MG/DL (ref 6–20)
CALCIUM SERPL-MCNC: 9.9 MG/DL (ref 8.8–10.4)
CHLORIDE SERPL-SCNC: 104 MMOL/L (ref 98–107)
CHOLEST SERPL-MCNC: 198 MG/DL
CREAT SERPL-MCNC: 0.93 MG/DL (ref 0.67–1.17)
EGFRCR SERPLBLD CKD-EPI 2021: >90 ML/MIN/1.73M2
ERYTHROCYTE [DISTWIDTH] IN BLOOD BY AUTOMATED COUNT: 11.8 % (ref 10–15)
FASTING STATUS PATIENT QL REPORTED: NO
FASTING STATUS PATIENT QL REPORTED: NO
GLUCOSE SERPL-MCNC: 91 MG/DL (ref 70–99)
HCO3 SERPL-SCNC: 25 MMOL/L (ref 22–29)
HCT VFR BLD AUTO: 45.8 % (ref 40–53)
HDLC SERPL-MCNC: 59 MG/DL
HGB BLD-MCNC: 15.7 G/DL (ref 13.3–17.7)
LDLC SERPL CALC-MCNC: 126 MG/DL
MCH RBC QN AUTO: 28.1 PG (ref 26.5–33)
MCHC RBC AUTO-ENTMCNC: 34.3 G/DL (ref 31.5–36.5)
MCV RBC AUTO: 82 FL (ref 78–100)
NONHDLC SERPL-MCNC: 139 MG/DL
PLATELET # BLD AUTO: 226 10E3/UL (ref 150–450)
POTASSIUM SERPL-SCNC: 4.2 MMOL/L (ref 3.4–5.3)
PROT SERPL-MCNC: 7 G/DL (ref 6.4–8.3)
RBC # BLD AUTO: 5.59 10E6/UL (ref 4.4–5.9)
SODIUM SERPL-SCNC: 138 MMOL/L (ref 135–145)
TRIGL SERPL-MCNC: 64 MG/DL
WBC # BLD AUTO: 5.6 10E3/UL (ref 4–11)

## 2025-07-09 PROCEDURE — 3078F DIAST BP <80 MM HG: CPT | Performed by: NURSE PRACTITIONER

## 2025-07-09 PROCEDURE — 80061 LIPID PANEL: CPT | Performed by: NURSE PRACTITIONER

## 2025-07-09 PROCEDURE — 36415 COLL VENOUS BLD VENIPUNCTURE: CPT | Performed by: NURSE PRACTITIONER

## 2025-07-09 PROCEDURE — 99395 PREV VISIT EST AGE 18-39: CPT | Performed by: NURSE PRACTITIONER

## 2025-07-09 PROCEDURE — 3074F SYST BP LT 130 MM HG: CPT | Performed by: NURSE PRACTITIONER

## 2025-07-09 PROCEDURE — 85027 COMPLETE CBC AUTOMATED: CPT | Performed by: NURSE PRACTITIONER

## 2025-07-09 PROCEDURE — 80053 COMPREHEN METABOLIC PANEL: CPT | Performed by: NURSE PRACTITIONER

## 2025-07-09 ASSESSMENT — PATIENT HEALTH QUESTIONNAIRE - PHQ9
SUM OF ALL RESPONSES TO PHQ QUESTIONS 1-9: 4
10. IF YOU CHECKED OFF ANY PROBLEMS, HOW DIFFICULT HAVE THESE PROBLEMS MADE IT FOR YOU TO DO YOUR WORK, TAKE CARE OF THINGS AT HOME, OR GET ALONG WITH OTHER PEOPLE: NOT DIFFICULT AT ALL
SUM OF ALL RESPONSES TO PHQ QUESTIONS 1-9: 4

## 2025-07-09 ASSESSMENT — ANXIETY QUESTIONNAIRES
8. IF YOU CHECKED OFF ANY PROBLEMS, HOW DIFFICULT HAVE THESE MADE IT FOR YOU TO DO YOUR WORK, TAKE CARE OF THINGS AT HOME, OR GET ALONG WITH OTHER PEOPLE?: NOT DIFFICULT AT ALL
3. WORRYING TOO MUCH ABOUT DIFFERENT THINGS: NOT AT ALL
GAD7 TOTAL SCORE: 1
4. TROUBLE RELAXING: NOT AT ALL
5. BEING SO RESTLESS THAT IT IS HARD TO SIT STILL: NOT AT ALL
7. FEELING AFRAID AS IF SOMETHING AWFUL MIGHT HAPPEN: NOT AT ALL
1. FEELING NERVOUS, ANXIOUS, OR ON EDGE: NOT AT ALL
GAD7 TOTAL SCORE: 1
GAD7 TOTAL SCORE: 1
6. BECOMING EASILY ANNOYED OR IRRITABLE: NOT AT ALL
IF YOU CHECKED OFF ANY PROBLEMS ON THIS QUESTIONNAIRE, HOW DIFFICULT HAVE THESE PROBLEMS MADE IT FOR YOU TO DO YOUR WORK, TAKE CARE OF THINGS AT HOME, OR GET ALONG WITH OTHER PEOPLE: NOT DIFFICULT AT ALL
2. NOT BEING ABLE TO STOP OR CONTROL WORRYING: SEVERAL DAYS
7. FEELING AFRAID AS IF SOMETHING AWFUL MIGHT HAPPEN: NOT AT ALL

## 2025-07-09 NOTE — PROGRESS NOTES
"Preventive Care Visit  [facility]  Breanna Devlin NP, Internal Medicine  Jul 9, 2025      Assessment & Plan     Encounter for annual physical exam  Reviewed overall health maintenance.  Reviewed routine screening guidelines.  Screening for colorectal cancer start age 45.  Discussed the importance of healthy diet, exercise.  Return in 1 year for annual exam.  - CBC with platelets; Future  - CBC with platelets    Hyperlipidemia, unspecified hyperlipidemia type  History of some mild hyperlipidemia with a previous LDL of 129.  Will recheck lipid panel today.  - Lipid Profile; Future  - Lipid Profile    Hepatitis C virus infection resolved after antiviral drug therapy  History of hepatitis C infection in 2020 that was treated.  Will check liver function today  - Comprehensive metabolic panel; Future  - Comprehensive metabolic panel    Lumbar back pain  Chronic issue.  Has had some injections into disks in the low back in the past.  States that it has been better lately.  No sciatica today.  Follows outside orthopedics but cannot recall if it [facility] or [facility].    Opioid use disorder, severe, in sustained remission (H)  Sober for 5-1/2 years.  States he used to use all kinds of different medications from meth and heroin.  Congratulated him on his ongoing sobriety.    Lipoma of skin and subcutaneous tissue  Small lipoma noted in the low back.  Patient states that he has similar finding on the chest wall that was previously evaluated.  We discussed possible referral to general surgery to have these further evaluated and discussed possible removal.  Patient deferred at this time.  Offered reassurance that these are benign masses.  Discussed changes to monitor for.      BMI  Estimated body mass index is 28.73 kg/m  as calculated from the following:    Height as of this encounter: 1.803 m (5' 10.98\").    Weight as of this encounter: 93.4 kg (205 lb 14.4 oz).   Weight management plan: " Discussed healthy diet and exercise guidelines    Counseling  Appropriate preventive services were addressed with this patient via screening, questionnaire, or discussion as appropriate for fall prevention, nutrition, physical activity, Tobacco-use cessation, social engagement, weight loss and cognition.  Checklist reviewing preventive services available has been given to the patient.  Reviewed patient's diet, addressing concerns and/or questions.   The patient was instructed to see the dentist every 6 months.   Reviewed preventive health counseling, as reflected in patient instructions       Healthy diet/nutrition       Colorectal cancer screening       Self testicular exams        Jameson Goodwin is a 34 year old, presenting for the following:  Physical (No fasting )        7/9/2025     9:52 AM   Additional Questions   Roomed by JOSE Gentile  Buddy is a pleasant 34-year-old male with a significant history of opioid use disorder and sustained remission, chronic low back pain, hepatitis C infection resolved after antiviral drug therapy and hyperlipidemia here today for an annual exam.  States he has a lot of underlying Olivia's about his health overall.  States he feels well but he is always worried about something under the service.  He is  and has a 1 and half-year-old daughter.         Advance Care Planning    Discussed advance care planning with patient; however, patient declined at this time.        7/4/2025   General Health   How would you rate your overall physical health? Good   Feel stress (tense, anxious, or unable to sleep) Only a little   (!) STRESS CONCERN      7/4/2025   Nutrition   Three or more servings of calcium each day? Yes   Diet: Regular (no restrictions)   How many servings of fruit and vegetables per day? (!) 2-3   How many sweetened beverages each day? (!) 3         7/4/2025   Exercise   Days per week of moderate/strenous exercise 6 days   Average minutes spent  "exercising at this level 150+ min         7/4/2025   Social Factors   Frequency of gathering with friends or relatives Once a week   Worry food won't last until get money to buy more No   Food not last or not have enough money for food? No   Do you have housing? (Housing is defined as stable permanent housing and does not include staying outside in a car, in a tent, in an abandoned building, in an overnight shelter, or couch-surfing.) Yes   Are you worried about losing your housing? No   Lack of transportation? No   Unable to get utilities (heat,electricity)? No         7/4/2025   Dental   Dentist two times every year? (!) NO       Today's PHQ-9 Score:       7/9/2025     9:51 AM   PHQ-9 SCORE   PHQ-9 Total Score MyChart 4 (Minimal depression)   PHQ-9 Total Score 4        Proxy-reported         7/4/2025   Substance Use   Alcohol more than 3/day or more than 7/wk No   Do you use any other substances recreationally? No     Social History     Tobacco Use    Smoking status: Former     Current packs/day: 0.00     Types: Cigarettes     Quit date: 6/15/2022     Years since quitting: 3.0    Smokeless tobacco: Never   Vaping Use    Vaping status: Never Used   Substance Use Topics    Alcohol use: Not Currently    Drug use: Not Currently           7/4/2025   STI Screening   New sexual partner(s) since last STI/HIV test? No         7/4/2025   Contraception/Family Planning   Questions about contraception or family planning No        Reviewed and updated as needed this visit by Provider                  ROS  Comprehensive 12-point review of systems was completed and negative except as noted in HPI.       Objective    Exam  There were no vitals taken for this visit.   Estimated body mass index is 31.44 kg/m  as calculated from the following:    Height as of 4/2/24: 1.803 m (5' 11\").    Weight as of 4/2/24: 102.2 kg (225 lb 6.4 oz).    Physical Exam  Constitutional: In no acute distress.  Clean appearance.  Ears: Bilateral TMs are " intact without any erythema or effusion.  Grossly normal hearing.  Oropharynx: Normal mucosa.  Dentition and gingiva is appropriate.  Posterior oropharynx without any abnormalities.  Neck: Supple.  Trachea is midline.  No thyromegaly.  Neck is without tenderness or masses.  Cardiovascular: Regular rate and rhythm.  Normal peripheral perfusion.  No edema.   Respiratory: Lungs are clear bilaterally.  Normal respiratory effort.  Skin: Skin is pink, warm and dry.  Small palpable mass in the left mid to low back.  Consistent with possible lipoma versus small ganglion cyst.  Gastrointestinal: Soft and flat.  Normal bowel sounds.  Nontender throughout upon palpation.  No organomegaly or masses.  Negative for CVA tenderness.  Genitourinary: Deferred.  No concerns.  Musculoskeletal:  Normal range of motion of extremities.  Gait normal.  Able to mount exam table without difficulties.  Psychiatric: Appropriate affect and demeanor.  Memory intact.  Good insight and judgment.  Neurologic: Sensation and temperature of extremities appropriate.  No tremor or involuntary movement noted.          Signed Electronically by: Breanna Devlin NP    Answers submitted by the patient for this visit:  Patient Health Questionnaire (Submitted on 7/9/2025)  If you checked off any problems, how difficult have these problems made it for you to do your work, take care of things at home, or get along with other people?: Not difficult at all  PHQ9 TOTAL SCORE: 4  Patient Health Questionnaire (G7) (Submitted on 7/9/2025)  SHERRI 7 TOTAL SCORE: 1

## 2025-07-09 NOTE — PATIENT INSTRUCTIONS
Labs today. Will let you know results through [facility] once available.      Let me know if wanting a referral at any time to see general surgery to discuss removal of your lipomas.    Let me know if it anytime you are also needing a referral to see orthopedic/spine for return or worsening of your back pain.    Due for screening of colorectal cancer start age 45.  Screening for prostate cancer to start age 50.    Eat a quality diet (generally, low in simple sugars, starches, cholesterol and saturated fat.)    Exercise regularly. Ideally you would have 30 minutes of aerobic exercise at least 4 times weekly. Find something you enjoy and a friend to do it with you.    Apply sun block (SPF 25 or greater) on exposed skin anytime you are out in the sun to prevent skin cancer.     Do testicular self-exam once monthly. Schedule an appointment if you find a nodule on one of the testicles or have another finding that concerns you.    Wear a seatbelt whenever you are in a car.    Consider a flu shot every fall.    Follow up in one year, return earlier if needed